# Patient Record
Sex: FEMALE | Race: WHITE | NOT HISPANIC OR LATINO | Employment: OTHER | URBAN - METROPOLITAN AREA
[De-identification: names, ages, dates, MRNs, and addresses within clinical notes are randomized per-mention and may not be internally consistent; named-entity substitution may affect disease eponyms.]

---

## 2024-01-09 DIAGNOSIS — M25.50 ARTHRALGIA, UNSPECIFIED JOINT: Primary | ICD-10-CM

## 2024-01-09 RX ORDER — MELOXICAM 15 MG/1
15 TABLET ORAL DAILY
Qty: 90 TABLET | Refills: 1 | Status: SHIPPED | OUTPATIENT
Start: 2024-01-09 | End: 2024-05-23

## 2024-01-09 RX ORDER — MELOXICAM 15 MG/1
15 TABLET ORAL DAILY
COMMUNITY
Start: 2023-08-28 | End: 2024-01-09 | Stop reason: SDUPTHER

## 2024-01-18 PROBLEM — C54.1 ENDOMETRIAL CANCER (MULTI): Status: ACTIVE | Noted: 2018-10-24

## 2024-01-18 PROBLEM — G47.00 INSOMNIA: Status: ACTIVE | Noted: 2024-01-18

## 2024-01-18 PROBLEM — G47.30 SLEEP APNEA: Status: ACTIVE | Noted: 2024-01-18

## 2024-01-18 PROBLEM — E03.9 HYPOTHYROIDISM: Status: ACTIVE | Noted: 2022-12-07

## 2024-01-18 PROBLEM — D22.9 ATYPICAL MOLE: Status: ACTIVE | Noted: 2024-01-18

## 2024-01-18 PROBLEM — F31.62 BIPOLAR DISORDER, CURRENT EPISODE MIXED, MODERATE (MULTI): Status: ACTIVE | Noted: 2020-10-12

## 2024-01-18 PROBLEM — K52.9 GASTROENTERITIS: Status: ACTIVE | Noted: 2024-01-18

## 2024-01-18 PROBLEM — R15.9 RECTAL INCONTINENCE: Status: ACTIVE | Noted: 2024-01-18

## 2024-01-18 PROBLEM — F41.0 PANIC ATTACKS: Status: ACTIVE | Noted: 2024-01-18

## 2024-01-18 PROBLEM — R10.11 ABDOMINAL PAIN, RUQ (RIGHT UPPER QUADRANT): Status: ACTIVE | Noted: 2024-01-18

## 2024-01-18 PROBLEM — S82.209A TIBIA FRACTURE: Status: ACTIVE | Noted: 2024-01-18

## 2024-01-18 PROBLEM — F43.10 PTSD (POST-TRAUMATIC STRESS DISORDER): Status: ACTIVE | Noted: 2021-05-05

## 2024-01-18 PROBLEM — J45.909 ASTHMA (HHS-HCC): Status: ACTIVE | Noted: 2022-12-07

## 2024-01-18 PROBLEM — T50.902A SUICIDAL OVERDOSE (MULTI): Status: ACTIVE | Noted: 2024-01-18

## 2024-01-18 PROBLEM — J44.9 COPD (CHRONIC OBSTRUCTIVE PULMONARY DISEASE) (MULTI): Status: ACTIVE | Noted: 2019-01-09

## 2024-01-18 PROBLEM — I73.00 RAYNAUD'S DISEASE: Status: ACTIVE | Noted: 2022-12-07

## 2024-01-18 PROBLEM — E55.9 VITAMIN D DEFICIENCY: Status: ACTIVE | Noted: 2024-01-18

## 2024-01-18 PROBLEM — E66.01 CLASS 2 SEVERE OBESITY WITH BODY MASS INDEX (BMI) OF 35 TO 39.9 WITH SERIOUS COMORBIDITY (MULTI): Status: ACTIVE | Noted: 2024-01-18

## 2024-01-18 PROBLEM — R19.7 DIARRHEA: Status: ACTIVE | Noted: 2024-01-18

## 2024-01-18 PROBLEM — G56.03 BILATERAL CARPAL TUNNEL SYNDROME: Status: ACTIVE | Noted: 2024-01-18

## 2024-01-18 PROBLEM — R73.03 PREDIABETES: Status: ACTIVE | Noted: 2024-01-18

## 2024-01-18 PROBLEM — H61.22 HEARING LOSS OF LEFT EAR DUE TO CERUMEN IMPACTION: Status: ACTIVE | Noted: 2024-01-18

## 2024-01-18 PROBLEM — H60.501 ACUTE OTITIS EXTERNA OF RIGHT EAR: Status: ACTIVE | Noted: 2024-01-18

## 2024-01-18 PROBLEM — R63.4 WEIGHT LOSS: Status: ACTIVE | Noted: 2024-01-18

## 2024-01-18 PROBLEM — H66.91 RIGHT ACUTE OTITIS MEDIA: Status: ACTIVE | Noted: 2024-01-18

## 2024-01-18 PROBLEM — E66.812 CLASS 2 SEVERE OBESITY WITH BODY MASS INDEX (BMI) OF 35 TO 39.9 WITH SERIOUS COMORBIDITY: Status: ACTIVE | Noted: 2024-01-18

## 2024-01-18 PROBLEM — K21.9 GASTROESOPHAGEAL REFLUX DISEASE: Status: ACTIVE | Noted: 2022-12-07

## 2024-01-18 PROBLEM — F31.9 BIPOLAR DEPRESSION (MULTI): Status: ACTIVE | Noted: 2024-01-18

## 2024-01-18 PROBLEM — E78.5 HYPERLIPIDEMIA: Status: ACTIVE | Noted: 2024-01-18

## 2024-01-18 PROBLEM — R73.9 HYPERGLYCEMIA: Status: ACTIVE | Noted: 2024-01-18

## 2024-01-21 DIAGNOSIS — E55.9 VITAMIN D DEFICIENCY: Primary | ICD-10-CM

## 2024-01-22 RX ORDER — ERGOCALCIFEROL 1.25 1/1
1 CAPSULE ORAL
Qty: 13 CAPSULE | Refills: 0 | Status: SHIPPED | OUTPATIENT
Start: 2024-01-22 | End: 2024-04-01

## 2024-01-22 RX ORDER — ERGOCALCIFEROL 1.25 MG/1
1 CAPSULE ORAL
COMMUNITY
Start: 2022-09-20

## 2024-01-25 RX ORDER — TIOTROPIUM BROMIDE AND OLODATEROL 3.124; 2.736 UG/1; UG/1
1 SPRAY, METERED RESPIRATORY (INHALATION) 2 TIMES DAILY
COMMUNITY

## 2024-01-31 ENCOUNTER — DOCUMENTATION (OUTPATIENT)
Dept: PRIMARY CARE | Facility: CLINIC | Age: 64
End: 2024-01-31
Payer: COMMERCIAL

## 2024-02-01 ENCOUNTER — TELEPHONE (OUTPATIENT)
Dept: PRIMARY CARE | Facility: CLINIC | Age: 64
End: 2024-02-01
Payer: COMMERCIAL

## 2024-02-01 NOTE — TELEPHONE ENCOUNTER
Pt called and can't remember the name of her pulmonologist, can't find the order for it either, she wants to know if Dr Lira remembers who he recommended or if she can get a new referral

## 2024-02-02 NOTE — PROGRESS NOTES
Patient: Etta Gerard    [id number]  : 1960 -- AGE 63 y.o.    Provider: Sheri Cuevas CNP     Location [facility]   Service Date:   2024            [facility]  New Visit Note      HISTORY OF PRESENT ILLNESS     The patient's referring provider is:Dr Lira, previosly seen by Miko    HISTORY OF PRESENT ILLNESS   Etta Gerard is a 63 y.o. female who presents to a [facility] for an evaluation with concerns of COPD and Asthma (Patient Pulm provider retierd.). I have independently interviewed and examined the patient in the office and reviewed available records.    Current History    On today's visit, the patient reports she wears 3 liters while sleeping. No am headaches. She has restful sleep, no daytime sleepiness. She had CPAP for ABILIO and she lost 100lbs and was able to taper down to nocturnal oxygenation via cannula. Now she has gained 70lbs due to smoking cessation.   At baseline,  has dyspnea on exertion, but  none at rest, but conversational dyspnea. Symptoms started many years ago, but has mostly been stable.  Currently sits for most of the day, works inside the house, but does not carry loads and do strenuous exercise.   Has to stop for breath after walking about 100 meters or a few minutes (mMRC 3).   Relates orthopnea, denies pnd/aidan.  Has gained X 40 pounds in the last X 12 months, since stopped smoking 11 months ago, used patch, nicotine gum.  Improving chronic cough with clear phlegm, C/o  wheezing, and denies, green, blood streaks. No night cough. No hemoptysis. No fever or shivering chills. Has no runny nose, or a tingling sensation in the back of his throat.  Denies chest pain or heartburn.     Previous pulmonary history:  no history of recurrent infections, or lung disease as a child.  No previous lung hx, never on oxygen or inhaler therapy.    previously was told they have asthma.  currently is on  2-3 liters at night supplemental oxygen. No exacerbations this year    Inhalers/nebulized medications: stiolto - been on 5 years and using albuterol 4 times a day consistently    Hospitalization History: Not been hospitalized over the last year for breathing related problem.    Sleep history:  Complains of snoring, has dreams about apnea, feeling tired during the day, and taking naps during the day.   She was tested and used CPAP. She lost 100 lbs then improved.     ALLERGIES AND MEDICATIONS     ALLERGIES  Allergies   Allergen Reactions    Codeine Unknown    Prednisolone Other    Prednisone Unknown       MEDICATIONS  Current Outpatient Medications   Medication Sig Dispense Refill    atorvastatin (Lipitor) 40 mg tablet Take 1 tablet (40 mg) by mouth once daily.      ergocalciferol (Vitamin D-2) 1.25 MG (00884 UT) capsule Take 1 capsule (1,250 mcg) by mouth 1 (one) time per week.      levothyroxine (Synthroid, Levoxyl) 100 mcg tablet TAKE 1 TABLET BY MOUTH DAILY 90 tablet 0    meloxicam (Mobic) 15 mg tablet Take 1 tablet (15 mg) by mouth once daily. 90 tablet 1    Stiolto Respimat 2.5-2.5 mcg/actuation mist inhaler Inhale 1 Inhalation 2 times a day.      atorvastatin (Lipitor) 40 mg tablet TAKE 1 TABLET BY MOUTH DAILY 90 tablet 0    Vitamin D2 1,250 mcg (50,000 unit) capsule TAKE 1 CAPSULE BY MOUTH WEEKLY 13 capsule 0     No current facility-administered medications for this visit.         PAST HISTORY     PAST MEDICAL HISTORY  She  has a past medical history of Personal history of malignant neoplasm, unspecified.hypothyroidism HLD, endometrial cancer    PAST SURGICAL HISTORY  Past Surgical History:   Procedure Laterality Date    OTHER SURGICAL HISTORY  05/20/2019    Colon surgery    OTHER SURGICAL HISTORY  05/20/2019    Hysterectomy    OTHER SURGICAL HISTORY  05/20/2019    Foot surgery    OTHER SURGICAL HISTORY  05/21/2019    Tonsillectomy    OTHER SURGICAL HISTORY  05/21/2019    Cholecystectomy    OTHER SURGICAL  HISTORY  01/16/2022    Tibia fracture repair       IMMUNIZATION HISTORY  Immunization History   Administered Date(s) Administered    Pfizer Purple Cap SARS-CoV-2 07/22/2021, 08/12/2021       SOCIAL HISTORY  She  reports that she quit smoking about 13 months ago. Her smoking use included cigarettes. She has a 92.00 pack-year smoking history. She has never used smokeless tobacco. She reports that she does not currently use alcohol. She reports that she does not use drugs. She Patient     OCCUPATIONAL/ENVIRONMENTAL HISTORY  Previously worked as:    DOES/DOES NOT EC: does not have known exposure to asbestos, silica, beryllium or inhaled metals.  DOES/DOES NOT EC: does have exposure to birds or exotic animals. 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  DOES/DOES NOT EC: does have a family history of cancer.  DOES/DOES NOT EC: does not have a family history of autoimmune disorders.    RESULTS/DATA     Pulmonary Function Test Results     No Testing Done    Chest Radiograph     XR CHEST 1 VIEW 11/22/2019      COMPARISON:  Chest x-ray 11/27/2018, 11/25/2018.      FINDINGS:  The cardiomediastinal silhouette is within normal limits.  There is  no overt vascular congestion.    No focal consolidation, pleural effusion or pneumothorax.    Impression  No acute radiographic finding in the chest.      Chest CT Scan     No Testing Done      Echocardiogram     No testing done         REVIEW OF SYSTEMS     REVIEW OF SYSTEMS  Review of Systems   Respiratory:  Positive for cough and shortness of breath.    Cardiovascular:  Positive for palpitations.  "  Musculoskeletal:  Positive for arthralgias.   All other systems reviewed and are negative.        PHYSICAL EXAM     VITAL SIGNS: /85 (BP Location: Left arm, Patient Position: Sitting, BP Cuff Size: Large adult)   Pulse 89   Temp 36.1 °C (97 °F) (Temporal)   Resp 16   Ht 1.676 m (5' 6\")   Wt 126 kg (277 lb 6.4 oz)   SpO2 (!) 89%   BMI 44.77 kg/m²      CURRENT WEIGHT: [unfilled]  BMI: [unfilled]  PREVIOUS WEIGHTS:  Wt Readings from Last 3 Encounters:   02/05/24 126 kg (277 lb 6.4 oz)   09/12/23 121 kg (266 lb)   07/13/23 118 kg (260 lb)       Physical Exam  Constitutional:       Appearance: Normal appearance.   HENT:      Head: Normocephalic and atraumatic.      Right Ear: External ear normal.      Left Ear: External ear normal.      Nose: Nose normal.      Mouth/Throat:      Mouth: Mucous membranes are moist.      Pharynx: Oropharynx is clear.   Eyes:      Extraocular Movements: Extraocular movements intact.      Conjunctiva/sclera: Conjunctivae normal.      Pupils: Pupils are equal, round, and reactive to light.   Cardiovascular:      Rate and Rhythm: Normal rate and regular rhythm.      Pulses: Normal pulses.      Heart sounds: Normal heart sounds.   Pulmonary:      Effort: Pulmonary effort is normal.      Breath sounds: Normal breath sounds.   Abdominal:      General: Bowel sounds are normal.      Palpations: Abdomen is soft.   Musculoskeletal:         General: Normal range of motion.      Cervical back: Normal range of motion and neck supple.   Skin:     General: Skin is warm and dry.   Neurological:      General: No focal deficit present.      Mental Status: She is alert and oriented to person, place, and time. Mental status is at baseline.   Psychiatric:         Mood and Affect: Mood normal.         Behavior: Behavior normal.         Thought Content: Thought content normal.         Judgment: Judgment normal.         ASSESSMENT/PLAN     Ms. Gerard is a 63 y.o. female and  has a past medical history " of Personal history of malignant neoplasm, unspecified. She was referred to the [facility] for evaluation of asthma/COPD    Problem List and Orders      Assessment and Plan / Recommendations:  Problem List Items Addressed This Visit       COPD (chronic obstructive pulmonary disease) (CMS/HCC)    Sleep apnea - Primary           COPD/asthma  Obtain pulmonary function test and 6 minute walk test  albuterol hfa 2 puffs or albuterol nebs every 4-6 hours as needed  - stop stiolto, start breztri 2 puffs twice a day, rinse mouth afterwards, since needing albuterol  4 times a day  - will check Ig E, RAST panel and CBC with diff - due to asthma (her father had allergic asthma)    Tobacco abuse  Given the duration of their cigarettes smoking they meet the eligibility criteria for lung cancer screening with a low-dose CT scan. We discussed the risks and benefits of screening. We discussed the importance of adhering to annual lung cancer screening with this method, the impact of comorbidities, and their ability or willingness to undergo diagnosis and treatment if abnormalities are discovered.    ABILIO - past history when patient was at this weight, tapered down to nocturnal O2 with weight loss. Now due to weight gain will repeat in lab sleep study    Hypoxia: Presents to clinic today with O2 sat _89__% on room air.  Oxywalk completed. _93% ___% ambulating on room air.        Thank you for visiting the Pulmonary clinic today!       Return to clinic after __4-6_weeks and after PFTs, CT scan and sleep study complete  or sooner if needed   Sheri Cuevas CNP  My office number is [phone] -     Any test results will be discussed at next visit -- please make sure to make a follow up appt after testing.

## 2024-02-05 ENCOUNTER — OFFICE VISIT (OUTPATIENT)
Dept: PULMONOLOGY | Facility: CLINIC | Age: 64
End: 2024-02-05
Payer: MEDICARE

## 2024-02-05 VITALS
TEMPERATURE: 97 F | HEART RATE: 89 BPM | SYSTOLIC BLOOD PRESSURE: 127 MMHG | HEIGHT: 66 IN | DIASTOLIC BLOOD PRESSURE: 85 MMHG | BODY MASS INDEX: 44.58 KG/M2 | OXYGEN SATURATION: 89 % | WEIGHT: 277.4 LBS | RESPIRATION RATE: 16 BRPM

## 2024-02-05 DIAGNOSIS — F17.211 NICOTINE DEPENDENCE, CIGARETTES, IN REMISSION: ICD-10-CM

## 2024-02-05 DIAGNOSIS — J44.9 CHRONIC OBSTRUCTIVE PULMONARY DISEASE, UNSPECIFIED COPD TYPE (MULTI): ICD-10-CM

## 2024-02-05 DIAGNOSIS — G47.30 SLEEP APNEA, UNSPECIFIED TYPE: Primary | ICD-10-CM

## 2024-02-05 DIAGNOSIS — G47.34 NOCTURNAL HYPOXIA: ICD-10-CM

## 2024-02-05 DIAGNOSIS — J45.20 MILD INTERMITTENT ASTHMA, UNSPECIFIED WHETHER COMPLICATED (HHS-HCC): ICD-10-CM

## 2024-02-05 DIAGNOSIS — Z72.0 TOBACCO ABUSE DISORDER: ICD-10-CM

## 2024-02-05 PROCEDURE — 99205 OFFICE O/P NEW HI 60 MIN: CPT | Performed by: NURSE PRACTITIONER

## 2024-02-05 PROCEDURE — 1036F TOBACCO NON-USER: CPT | Performed by: NURSE PRACTITIONER

## 2024-02-05 PROCEDURE — 1159F MED LIST DOCD IN RCRD: CPT | Performed by: NURSE PRACTITIONER

## 2024-02-05 PROCEDURE — 1126F AMNT PAIN NOTED NONE PRSNT: CPT | Performed by: NURSE PRACTITIONER

## 2024-02-05 RX ORDER — BUDESONIDE, GLYCOPYRROLATE, AND FORMOTEROL FUMARATE 160; 9; 4.8 UG/1; UG/1; UG/1
2 AEROSOL, METERED RESPIRATORY (INHALATION)
Qty: 10.7 G | Refills: 3 | Status: SHIPPED | OUTPATIENT
Start: 2024-02-05 | End: 2024-03-19 | Stop reason: SDUPTHER

## 2024-02-05 ASSESSMENT — PAIN SCALES - GENERAL: PAINLEVEL: 0-NO PAIN

## 2024-02-05 ASSESSMENT — ENCOUNTER SYMPTOMS
PALPITATIONS: 1
SHORTNESS OF BREATH: 1
DEPRESSION: 1
LOSS OF SENSATION IN FEET: 0
ARTHRALGIAS: 1
OCCASIONAL FEELINGS OF UNSTEADINESS: 0
COUGH: 1

## 2024-02-05 ASSESSMENT — COLUMBIA-SUICIDE SEVERITY RATING SCALE - C-SSRS
2. HAVE YOU ACTUALLY HAD ANY THOUGHTS OF KILLING YOURSELF?: NO
1. IN THE PAST MONTH, HAVE YOU WISHED YOU WERE DEAD OR WISHED YOU COULD GO TO SLEEP AND NOT WAKE UP?: NO
6. HAVE YOU EVER DONE ANYTHING, STARTED TO DO ANYTHING, OR PREPARED TO DO ANYTHING TO END YOUR LIFE?: NO

## 2024-02-05 ASSESSMENT — PATIENT HEALTH QUESTIONNAIRE - PHQ9
2. FEELING DOWN, DEPRESSED OR HOPELESS: NOT AT ALL
1. LITTLE INTEREST OR PLEASURE IN DOING THINGS: NOT AT ALL
SUM OF ALL RESPONSES TO PHQ9 QUESTIONS 1 AND 2: 0

## 2024-02-05 NOTE — PATIENT INSTRUCTIONS
COPD/asthma  Obtain pulmonary function test and 6 minute walk test  albuterol hfa 2 puffs or albuterol nebs every 4-6 hours as needed  - stop stiolto, start breztri 2 puffs twice a day, rinse mouth afterwards  - will check Ig E, RAST panel and CBC with diff     Tobacco abuse  Given the duration of their cigarettes smoking they meet the eligibility criteria for lung cancer screening with a low-dose CT scan. We discussed the risks and benefits of screening. We discussed the importance of adhering to annual lung cancer screening with this method, the impact of comorbidities, and their ability or willingness to undergo diagnosis and treatment if abnormalities are discovered.    ABILIO - past history when patient was at this weight, tapered down to nocturnal O2 with weight loss. Now due to weight gain will repeat in lab sleep study    Hypoxia: Presents to clinic today with O2 sat _89__% on room air.  Oxywalk completed. _93% ___% ambulating on room air.        Thank you for visiting the Pulmonary clinic today!       Return to clinic after __4-6_weeks and after PFTs, CT scan and sleep study complete  or sooner if needed   Sheri Cuevas CNP  My office number is [phone] -     Any test results will be discussed at next visit -- please make sure to make a follow up appt after testing.

## 2024-02-27 ENCOUNTER — LAB (OUTPATIENT)
Dept: LAB | Facility: LAB | Age: 64
End: 2024-02-27
Payer: MEDICARE

## 2024-02-27 ENCOUNTER — HOSPITAL ENCOUNTER (OUTPATIENT)
Dept: RADIOLOGY | Facility: HOSPITAL | Age: 64
Discharge: HOME | End: 2024-02-27
Payer: MEDICARE

## 2024-02-27 DIAGNOSIS — J44.9 CHRONIC OBSTRUCTIVE PULMONARY DISEASE, UNSPECIFIED (MULTI): ICD-10-CM

## 2024-02-27 DIAGNOSIS — M75.81 OTHER SHOULDER LESIONS, RIGHT SHOULDER: ICD-10-CM

## 2024-02-27 DIAGNOSIS — Z72.0 TOBACCO ABUSE DISORDER: ICD-10-CM

## 2024-02-27 DIAGNOSIS — G47.00 INSOMNIA, UNSPECIFIED: ICD-10-CM

## 2024-02-27 DIAGNOSIS — F31.9 BIPOLAR DISORDER, UNSPECIFIED (MULTI): ICD-10-CM

## 2024-02-27 DIAGNOSIS — F17.211 NICOTINE DEPENDENCE, CIGARETTES, IN REMISSION: ICD-10-CM

## 2024-02-27 DIAGNOSIS — K52.9 NONINFECTIVE GASTROENTERITIS AND COLITIS, UNSPECIFIED: ICD-10-CM

## 2024-02-27 DIAGNOSIS — R73.03 PREDIABETES: ICD-10-CM

## 2024-02-27 DIAGNOSIS — K21.9 GASTRO-ESOPHAGEAL REFLUX DISEASE WITHOUT ESOPHAGITIS: ICD-10-CM

## 2024-02-27 DIAGNOSIS — G47.30 SLEEP APNEA, UNSPECIFIED: ICD-10-CM

## 2024-02-27 DIAGNOSIS — E55.9 VITAMIN D DEFICIENCY, UNSPECIFIED: ICD-10-CM

## 2024-02-27 DIAGNOSIS — R73.9 HYPERGLYCEMIA, UNSPECIFIED: ICD-10-CM

## 2024-02-27 DIAGNOSIS — E03.9 HYPOTHYROIDISM, UNSPECIFIED: ICD-10-CM

## 2024-02-27 DIAGNOSIS — E78.5 HYPERLIPIDEMIA, UNSPECIFIED: ICD-10-CM

## 2024-02-27 DIAGNOSIS — Z00.00 ENCOUNTER FOR GENERAL ADULT MEDICAL EXAMINATION WITHOUT ABNORMAL FINDINGS: Primary | ICD-10-CM

## 2024-02-27 LAB
25(OH)D3 SERPL-MCNC: 67 NG/ML (ref 30–100)
ALBUMIN SERPL BCP-MCNC: 4.1 G/DL (ref 3.4–5)
ALP SERPL-CCNC: 166 U/L (ref 33–136)
ALT SERPL W P-5'-P-CCNC: 22 U/L (ref 7–45)
ANION GAP SERPL CALC-SCNC: 14 MMOL/L (ref 10–20)
AST SERPL W P-5'-P-CCNC: 19 U/L (ref 9–39)
BILIRUB SERPL-MCNC: 0.7 MG/DL (ref 0–1.2)
BUN SERPL-MCNC: 18 MG/DL (ref 6–23)
CALCIUM SERPL-MCNC: 9.8 MG/DL (ref 8.6–10.3)
CHLORIDE SERPL-SCNC: 102 MMOL/L (ref 98–107)
CHOLEST SERPL-MCNC: 170 MG/DL (ref 0–199)
CHOLESTEROL/HDL RATIO: 2.9
CO2 SERPL-SCNC: 28 MMOL/L (ref 21–32)
CREAT SERPL-MCNC: 0.78 MG/DL (ref 0.5–1.05)
EGFRCR SERPLBLD CKD-EPI 2021: 84 ML/MIN/1.73M*2
ERYTHROCYTE [DISTWIDTH] IN BLOOD BY AUTOMATED COUNT: 12 % (ref 11.5–14.5)
EST. AVERAGE GLUCOSE BLD GHB EST-MCNC: 128 MG/DL
GLUCOSE SERPL-MCNC: 145 MG/DL (ref 74–99)
HBA1C MFR BLD: 6.1 %
HCT VFR BLD AUTO: 46.5 % (ref 36–46)
HDLC SERPL-MCNC: 59.4 MG/DL
HGB BLD-MCNC: 15.5 G/DL (ref 12–16)
LDLC SERPL CALC-MCNC: 88 MG/DL
MCH RBC QN AUTO: 29.1 PG (ref 26–34)
MCHC RBC AUTO-ENTMCNC: 33.3 G/DL (ref 32–36)
MCV RBC AUTO: 87 FL (ref 80–100)
NON HDL CHOLESTEROL: 111 MG/DL (ref 0–149)
NRBC BLD-RTO: 0 /100 WBCS (ref 0–0)
PLATELET # BLD AUTO: 303 X10*3/UL (ref 150–450)
POTASSIUM SERPL-SCNC: 4.3 MMOL/L (ref 3.5–5.3)
PROT SERPL-MCNC: 7 G/DL (ref 6.4–8.2)
RBC # BLD AUTO: 5.32 X10*6/UL (ref 4–5.2)
SODIUM SERPL-SCNC: 140 MMOL/L (ref 136–145)
TRIGL SERPL-MCNC: 111 MG/DL (ref 0–149)
TSH SERPL-ACNC: 0.93 MIU/L (ref 0.44–3.98)
VLDL: 22 MG/DL (ref 0–40)
WBC # BLD AUTO: 7.5 X10*3/UL (ref 4.4–11.3)

## 2024-02-27 PROCEDURE — 84443 ASSAY THYROID STIM HORMONE: CPT

## 2024-02-27 PROCEDURE — 71271 CT THORAX LUNG CANCER SCR C-: CPT

## 2024-02-27 PROCEDURE — 85027 COMPLETE CBC AUTOMATED: CPT

## 2024-02-27 PROCEDURE — 83036 HEMOGLOBIN GLYCOSYLATED A1C: CPT

## 2024-02-27 PROCEDURE — 82306 VITAMIN D 25 HYDROXY: CPT

## 2024-02-27 PROCEDURE — 80053 COMPREHEN METABOLIC PANEL: CPT

## 2024-02-27 PROCEDURE — 80061 LIPID PANEL: CPT

## 2024-02-27 PROCEDURE — 36415 COLL VENOUS BLD VENIPUNCTURE: CPT

## 2024-03-05 ASSESSMENT — ENCOUNTER SYMPTOMS
SHORTNESS OF BREATH: 1
PALPITATIONS: 1
ARTHRALGIAS: 1
COUGH: 1

## 2024-03-13 DIAGNOSIS — E03.9 ACQUIRED HYPOTHYROIDISM: ICD-10-CM

## 2024-03-14 RX ORDER — LEVOTHYROXINE SODIUM 100 UG/1
100 TABLET ORAL DAILY
Qty: 90 TABLET | Refills: 0 | Status: SHIPPED | OUTPATIENT
Start: 2024-03-14 | End: 2024-05-09 | Stop reason: SDUPTHER

## 2024-03-15 ENCOUNTER — HOSPITAL ENCOUNTER (OUTPATIENT)
Dept: RESPIRATORY THERAPY | Facility: HOSPITAL | Age: 64
Discharge: HOME | End: 2024-03-15
Payer: MEDICARE

## 2024-03-15 DIAGNOSIS — J44.9 CHRONIC OBSTRUCTIVE PULMONARY DISEASE, UNSPECIFIED COPD TYPE (MULTI): ICD-10-CM

## 2024-03-15 PROCEDURE — 94726 PLETHYSMOGRAPHY LUNG VOLUMES: CPT

## 2024-03-15 PROCEDURE — 94060 EVALUATION OF WHEEZING: CPT | Performed by: INTERNAL MEDICINE

## 2024-03-15 PROCEDURE — 94726 PLETHYSMOGRAPHY LUNG VOLUMES: CPT | Performed by: INTERNAL MEDICINE

## 2024-03-15 PROCEDURE — 94618 PULMONARY STRESS TESTING: CPT | Performed by: INTERNAL MEDICINE

## 2024-03-15 PROCEDURE — 94729 DIFFUSING CAPACITY: CPT | Performed by: INTERNAL MEDICINE

## 2024-03-19 ENCOUNTER — OFFICE VISIT (OUTPATIENT)
Dept: PULMONOLOGY | Facility: CLINIC | Age: 64
End: 2024-03-19
Payer: MEDICARE

## 2024-03-19 VITALS
WEIGHT: 272.4 LBS | BODY MASS INDEX: 43.78 KG/M2 | HEART RATE: 103 BPM | RESPIRATION RATE: 20 BRPM | SYSTOLIC BLOOD PRESSURE: 103 MMHG | HEIGHT: 66 IN | TEMPERATURE: 97.1 F | DIASTOLIC BLOOD PRESSURE: 79 MMHG

## 2024-03-19 DIAGNOSIS — J44.9 CHRONIC OBSTRUCTIVE PULMONARY DISEASE, UNSPECIFIED COPD TYPE (MULTI): ICD-10-CM

## 2024-03-19 DIAGNOSIS — G47.30 SLEEP APNEA, UNSPECIFIED TYPE: Primary | ICD-10-CM

## 2024-03-19 DIAGNOSIS — R91.1 PULMONARY NODULE: ICD-10-CM

## 2024-03-19 DIAGNOSIS — Z72.0 TOBACCO ABUSE DISORDER: ICD-10-CM

## 2024-03-19 DIAGNOSIS — G47.34 NOCTURNAL HYPOXIA: ICD-10-CM

## 2024-03-19 DIAGNOSIS — J45.20 MILD INTERMITTENT ASTHMA, UNSPECIFIED WHETHER COMPLICATED (HHS-HCC): ICD-10-CM

## 2024-03-19 LAB
MGC ASCENT PFT - FEV1 - POST: 2.02
MGC ASCENT PFT - FEV1 - PRE: 1.93
MGC ASCENT PFT - FEV1 - PREDICTED: 2.42
MGC ASCENT PFT - FVC - POST: 2.93
MGC ASCENT PFT - FVC - PRE: 2.88
MGC ASCENT PFT - FVC - PREDICTED: 3.09

## 2024-03-19 PROCEDURE — 1159F MED LIST DOCD IN RCRD: CPT | Performed by: NURSE PRACTITIONER

## 2024-03-19 PROCEDURE — 99214 OFFICE O/P EST MOD 30 MIN: CPT | Performed by: NURSE PRACTITIONER

## 2024-03-19 PROCEDURE — 1036F TOBACCO NON-USER: CPT | Performed by: NURSE PRACTITIONER

## 2024-03-19 RX ORDER — BUDESONIDE, GLYCOPYRROLATE, AND FORMOTEROL FUMARATE 160; 9; 4.8 UG/1; UG/1; UG/1
2 AEROSOL, METERED RESPIRATORY (INHALATION)
Qty: 10.7 G | Refills: 3 | Status: SHIPPED | OUTPATIENT
Start: 2024-03-19 | End: 2024-06-05

## 2024-03-19 ASSESSMENT — PATIENT HEALTH QUESTIONNAIRE - PHQ9
SUM OF ALL RESPONSES TO PHQ9 QUESTIONS 1 AND 2: 0
1. LITTLE INTEREST OR PLEASURE IN DOING THINGS: NOT AT ALL
2. FEELING DOWN, DEPRESSED OR HOPELESS: NOT AT ALL

## 2024-03-19 ASSESSMENT — COLUMBIA-SUICIDE SEVERITY RATING SCALE - C-SSRS
1. IN THE PAST MONTH, HAVE YOU WISHED YOU WERE DEAD OR WISHED YOU COULD GO TO SLEEP AND NOT WAKE UP?: NO
6. HAVE YOU EVER DONE ANYTHING, STARTED TO DO ANYTHING, OR PREPARED TO DO ANYTHING TO END YOUR LIFE?: NO
2. HAVE YOU ACTUALLY HAD ANY THOUGHTS OF KILLING YOURSELF?: NO

## 2024-03-19 ASSESSMENT — ENCOUNTER SYMPTOMS
DEPRESSION: 0
LOSS OF SENSATION IN FEET: 0
OCCASIONAL FEELINGS OF UNSTEADINESS: 0

## 2024-03-19 NOTE — PATIENT INSTRUCTIONS
COPD/asthma  3/15/2024 pulmonary function test  with Normal spirometry. There is no significant bronchodilator response. Lung volumes are normal. Air trapping is present.The DLCO corrected for hemoglobin is normal.6 minute walk test: patient walked 305 meters, no significant desaturation on room   6 minute walk test no desaturation   FENO 6  albuterol hfa 2 puffs or albuterol nebs every 4-6 hours as needed  - symptoms improving with breztri, cont breztri 2 puffs twice a day, rinse mouth afterwards, since needing albuterol  4 times a day, refilled breztri  - will check Ig E, RAST panel and CBC with diff - due to asthma (her father had allergic asthma) - did not obtain     Tobacco abuse - stopped smoking 1 year ago   Given the duration of their cigarettes smoking they meet the eligibility criteria for lung cancer screening with a low-dose CT scan. We discussed the risks and benefits of screening. We discussed the importance of adhering to annual lung cancer screening with this method, the impact of comorbidities, and their ability or willingness to undergo diagnosis and treatment if abnormalities are discovered.    2/27 CT chest revealed  10 mm nodular density in the right lung base, located along a bandlike opacity and likely a more focal area of atelectasis or scarring. Recommend however short-term follow-up chest CT in 3 months to confirm stability or improvement. 2. Additional scattered pulmonary nodules measuring up to 5 mm predominantly in the right lung as described above. 3. Mild upper lung predominant emphysema.  4. Small hiatal hernia.- repeat CT chest in 3 months if growing will proceed with bronchoscopy    ABILIO - past history when patient was at this weight, tapered down to nocturnal O2 with weight loss. Now due to weight gain will repeat in lab sleep study- sleep schedule not completed     Hypoxia: Presents to clinic today with O2 sat _90__% on room air. Oxywalk completed. _90-93% ___% ambulating on room  air.

## 2024-04-18 PROBLEM — F31.30 BIPOLAR DISORDER, MOST RECENT EPISODE DEPRESSED (MULTI): Status: ACTIVE | Noted: 2019-01-09

## 2024-04-18 PROBLEM — R09.02 HYPOXIA: Status: ACTIVE | Noted: 2024-04-18

## 2024-04-18 PROBLEM — H60.509 ACUTE OTITIS EXTERNA: Status: ACTIVE | Noted: 2024-01-18

## 2024-04-18 PROBLEM — R91.1 LUNG NODULE: Status: ACTIVE | Noted: 2024-04-18

## 2024-04-18 PROBLEM — E66.9 OBESITY WITH BODY MASS INDEX 30 OR GREATER: Status: ACTIVE | Noted: 2024-04-18

## 2024-04-18 PROBLEM — M75.81 TENDINITIS OF RIGHT ROTATOR CUFF: Status: ACTIVE | Noted: 2024-04-18

## 2024-04-18 PROBLEM — H91.92 HEARING LOSS OF LEFT EAR: Status: ACTIVE | Noted: 2024-01-18

## 2024-04-18 PROBLEM — M25.512 PAIN OF LEFT SHOULDER REGION: Status: ACTIVE | Noted: 2024-04-18

## 2024-04-18 PROBLEM — G56.00 CARPAL TUNNEL SYNDROME: Status: ACTIVE | Noted: 2024-04-18

## 2024-04-18 PROBLEM — T14.91XA ATTEMPTED SUICIDE (MULTI): Status: ACTIVE | Noted: 2024-01-18

## 2024-04-18 PROBLEM — D22.9 MELANOCYTIC NEVUS: Status: ACTIVE | Noted: 2024-01-18

## 2024-04-18 PROBLEM — Z85.9 HISTORY OF MALIGNANT NEOPLASM: Status: ACTIVE | Noted: 2024-04-18

## 2024-04-18 PROBLEM — U07.1 DISEASE DUE TO SEVERE ACUTE RESPIRATORY SYNDROME CORONAVIRUS 2 (SARS-COV-2): Status: ACTIVE | Noted: 2024-04-18

## 2024-04-18 PROBLEM — Z85.42 HISTORY OF ENDOMETRIAL CANCER: Status: ACTIVE | Noted: 2022-08-04

## 2024-04-18 PROBLEM — F31.10 BIPOLAR AFFECTIVE DISORDER, CURRENT EPISODE MANIC (MULTI): Status: ACTIVE | Noted: 2024-04-18

## 2024-04-18 PROBLEM — F41.0 PANIC ATTACK: Status: ACTIVE | Noted: 2021-05-05

## 2024-04-18 PROBLEM — F17.200 CURRENT SMOKER: Status: ACTIVE | Noted: 2024-04-18

## 2024-04-22 ENCOUNTER — OFFICE VISIT (OUTPATIENT)
Dept: PRIMARY CARE | Facility: CLINIC | Age: 64
End: 2024-04-22
Payer: MEDICARE

## 2024-04-22 VITALS
WEIGHT: 270 LBS | DIASTOLIC BLOOD PRESSURE: 70 MMHG | OXYGEN SATURATION: 93 % | HEART RATE: 75 BPM | HEIGHT: 65 IN | TEMPERATURE: 97 F | BODY MASS INDEX: 44.98 KG/M2 | SYSTOLIC BLOOD PRESSURE: 108 MMHG

## 2024-04-22 DIAGNOSIS — F41.0 PANIC ATTACK: ICD-10-CM

## 2024-04-22 DIAGNOSIS — F31.11 BIPOLAR AFFECTIVE DISORDER, CURRENTLY MANIC, MILD (MULTI): ICD-10-CM

## 2024-04-22 DIAGNOSIS — F51.01 PRIMARY INSOMNIA: ICD-10-CM

## 2024-04-22 DIAGNOSIS — E55.9 VITAMIN D DEFICIENCY: ICD-10-CM

## 2024-04-22 DIAGNOSIS — Z71.3 WEIGHT LOSS COUNSELING, ENCOUNTER FOR: ICD-10-CM

## 2024-04-22 DIAGNOSIS — E03.9 ACQUIRED HYPOTHYROIDISM: ICD-10-CM

## 2024-04-22 DIAGNOSIS — C54.1 MALIGNANT NEOPLASM OF ENDOMETRIUM (MULTI): ICD-10-CM

## 2024-04-22 DIAGNOSIS — R73.03 PREDIABETES: ICD-10-CM

## 2024-04-22 DIAGNOSIS — Z00.00 MEDICARE ANNUAL WELLNESS VISIT, SUBSEQUENT: Primary | ICD-10-CM

## 2024-04-22 DIAGNOSIS — J45.20 MILD INTERMITTENT ASTHMA WITHOUT COMPLICATION (HHS-HCC): ICD-10-CM

## 2024-04-22 DIAGNOSIS — Z12.11 ENCOUNTER FOR SCREENING FOR MALIGNANT NEOPLASM OF COLON: ICD-10-CM

## 2024-04-22 PROCEDURE — 99214 OFFICE O/P EST MOD 30 MIN: CPT | Performed by: FAMILY MEDICINE

## 2024-04-22 PROCEDURE — G0402 INITIAL PREVENTIVE EXAM: HCPCS | Performed by: FAMILY MEDICINE

## 2024-04-22 PROCEDURE — 99497 ADVNCD CARE PLAN 30 MIN: CPT | Performed by: FAMILY MEDICINE

## 2024-04-22 PROCEDURE — 1124F ACP DISCUSS-NO DSCNMKR DOCD: CPT | Performed by: FAMILY MEDICINE

## 2024-04-22 PROCEDURE — 1036F TOBACCO NON-USER: CPT | Performed by: FAMILY MEDICINE

## 2024-04-22 PROCEDURE — 1159F MED LIST DOCD IN RCRD: CPT | Performed by: FAMILY MEDICINE

## 2024-04-22 RX ORDER — ARIPIPRAZOLE 15 MG/1
1 TABLET ORAL DAILY
COMMUNITY
Start: 2022-09-12

## 2024-04-22 RX ORDER — ALBUTEROL SULFATE 90 UG/1
AEROSOL, METERED RESPIRATORY (INHALATION)
COMMUNITY
Start: 2023-02-13 | End: 2024-06-05 | Stop reason: SDUPTHER

## 2024-04-22 RX ORDER — PHENTERMINE HYDROCHLORIDE 37.5 MG/1
37.5 TABLET ORAL
Qty: 14 TABLET | Refills: 0 | Status: SHIPPED | OUTPATIENT
Start: 2024-04-22 | End: 2024-05-10 | Stop reason: SDUPTHER

## 2024-04-22 RX ORDER — ESCITALOPRAM OXALATE 10 MG/1
TABLET ORAL DAILY
COMMUNITY
Start: 2023-04-24

## 2024-04-22 RX ORDER — ZOLPIDEM TARTRATE 10 MG/1
10 TABLET ORAL DAILY PRN
COMMUNITY
Start: 2024-02-19 | End: 2024-05-19

## 2024-04-22 RX ORDER — IPRATROPIUM BROMIDE AND ALBUTEROL SULFATE 2.5; .5 MG/3ML; MG/3ML
SOLUTION RESPIRATORY (INHALATION)
COMMUNITY

## 2024-04-22 RX ORDER — DOXEPIN HYDROCHLORIDE 25 MG/1
25 CAPSULE ORAL
COMMUNITY
Start: 2023-07-12 | End: 2025-01-09

## 2024-04-22 ASSESSMENT — PATIENT HEALTH QUESTIONNAIRE - PHQ9
SUM OF ALL RESPONSES TO PHQ9 QUESTIONS 1 AND 2: 0
2. FEELING DOWN, DEPRESSED OR HOPELESS: NOT AT ALL
1. LITTLE INTEREST OR PLEASURE IN DOING THINGS: NOT AT ALL

## 2024-04-22 NOTE — PROGRESS NOTES
Advance Care Planning Note     Discussion Date: 04/22/24   Discussion Participants: patient    The patient wishes to discuss Advance Care Planning today and the following is a brief summary of our discussion.     Patient has capacity to make their own medical decisions: Yes  Health Care Agent/Surrogate Decision Maker documented in chart: No    Documents on file and valid:  Advance Directive/Living Will: No   Health Care Power of : No  Other: to be done     Communication of Medical Status/Prognosis:   na     Communication of Treatment Goals/Options:   na     Treatment Decisions  Goals of Care: survival is paramount regardless of prognosis, treatment outcome, or burden   na  Follow Up Plan  na  Team Members  na  Time Statement: Total face to face time spent on advance care planning was >15 minutes with 16 minutes spent in counseling, including the explanation.    Nik Lira, DO  4/22/2024 11:56 AM  Patient is here for annual wellness.  She is never had a colonoscopy does have family first-degree relatives with colon cancer would like to be screened.    Patient also would like to talk about medical weight loss she does not have any calories and 1 pound of fat and with the other medications as needed weight over the years.    REVIEW OF SYSTEMS: 12 systems negative except for those mentioned in the HPI. Reviewed home risks with patient. Patient feels safe at home.    PHYSICAL EXAMINATION:   Constitutional: The patient is alert and oriented x3, in no acute  distress.  Eyes: Extraocular movements are intact. Pupils are equal and reactive to light  ENT: Bilateral TMs within normal limits. Nasal turbinates are within normal limits. Throat within normal limits.  Neck: Supple without lymphadenopathy or JVD.  Heart: Regular rate and rhythm without murmur, click, gallop, or rub.  Lungs: Clear to auscultation bilaterally. No rales, rhonchi, or  wheezing.  Abdomen: Soft, nontender, nondistended. Normoactive bowel  sounds.   No palpable masses. Normal percussion  Musculoskeletal: 5/5 motor, upper and lower extremities.  Neurologic: Cranial nerves II through XII fully intact. +2/4 DTRs  in ankle and knee.  Psychiatric: Good judgment and insight. Normal affect and mood. No cognitive defects noted.  Lymphatic: Negative as noted above.  Skin: no rashes or lesions    Assessment:  Per EMR    Plan:  PHQ-9 is reviewed as well as also obesity.  I discussed and calculated out the patient at the maximum of 1400 leanne patient was 7 pounds per calendar month and would potentially do better if she can get up and exercise.    I discussed the patient medical weight loss and OARRS reviewed appropriate go on Adipex.  Also reviewed her blood work she is prediabetic as well this will also help treat this.  Discussed risk benefits will follow-up in 2 weeks and calorie counting.  Blood pressure is also well-maintained.  I will be working on the sugar.  Mood is also well-maintained and sleep  Discussed with the patient and reviewed annual wellness questionnaire form as well as cognitive deficits. Also discussed with the patient immunizations and risks for colonoscopy and other screening procedures    This dictation was created using Dragon dictation and may contain errors

## 2024-05-09 DIAGNOSIS — E03.9 ACQUIRED HYPOTHYROIDISM: ICD-10-CM

## 2024-05-09 RX ORDER — LEVOTHYROXINE SODIUM 100 UG/1
100 TABLET ORAL DAILY
Qty: 90 TABLET | Refills: 0 | Status: SHIPPED | OUTPATIENT
Start: 2024-05-09

## 2024-05-10 ENCOUNTER — OFFICE VISIT (OUTPATIENT)
Dept: PRIMARY CARE | Facility: CLINIC | Age: 64
End: 2024-05-10
Payer: MEDICARE

## 2024-05-10 VITALS
SYSTOLIC BLOOD PRESSURE: 112 MMHG | DIASTOLIC BLOOD PRESSURE: 78 MMHG | WEIGHT: 268 LBS | HEART RATE: 85 BPM | BODY MASS INDEX: 44.65 KG/M2 | HEIGHT: 65 IN | TEMPERATURE: 96 F

## 2024-05-10 DIAGNOSIS — R73.03 PREDIABETES: ICD-10-CM

## 2024-05-10 DIAGNOSIS — Z71.3 WEIGHT LOSS COUNSELING, ENCOUNTER FOR: Primary | ICD-10-CM

## 2024-05-10 DIAGNOSIS — F17.200 CURRENT SMOKER: ICD-10-CM

## 2024-05-10 DIAGNOSIS — F31.9 BIPOLAR DEPRESSION (MULTI): ICD-10-CM

## 2024-05-10 DIAGNOSIS — E66.01 OBESITY, CLASS III, BMI 40-49.9 (MORBID OBESITY) (MULTI): ICD-10-CM

## 2024-05-10 PROBLEM — E66.813 OBESITY, CLASS III, BMI 40-49.9 (MORBID OBESITY): Status: ACTIVE | Noted: 2024-05-10

## 2024-05-10 PROCEDURE — 1036F TOBACCO NON-USER: CPT | Performed by: FAMILY MEDICINE

## 2024-05-10 PROCEDURE — 1159F MED LIST DOCD IN RCRD: CPT | Performed by: FAMILY MEDICINE

## 2024-05-10 PROCEDURE — 99213 OFFICE O/P EST LOW 20 MIN: CPT | Performed by: FAMILY MEDICINE

## 2024-05-10 RX ORDER — PHENTERMINE HYDROCHLORIDE 37.5 MG/1
37.5 TABLET ORAL
Qty: 30 TABLET | Refills: 0 | Status: SHIPPED | OUTPATIENT
Start: 2024-05-10 | End: 2024-06-10 | Stop reason: SDUPTHER

## 2024-05-10 NOTE — PROGRESS NOTES
Patient is here 2-week follow-up of weight loss.  She has not been having a lot of exercise has been tracking her food and trying not to eat after 3:00.    REVIEW OF SYSTEMS: 12 systems negative except for those mentioned in the HPI.    PHYSICAL EXAMINATION:   Constitutional: The patient is alert, in no acute  distress.  Eyes: Extraocular movements are intact.   ENT: external ear canals patent  Neck: no  JVD.  Heart: no JVD  Lungs: Normal respiration without stridor or nasal flaring   Psychiatric: Good judgment and insight. Normal affect and mood.  Skin: no rashes or lesions    Assessment:  per EMR    Plan:  Reevaluation calories as well as also liquid calories discussed with the patient.  She will continue Adipex also try and have a little bit more activity level to help.  I went through some of her common foods and discussed caloric intake.  Will follow-up in 1 month    This dictation was created using Dragon dictation and may contain errors

## 2024-05-14 ENCOUNTER — APPOINTMENT (OUTPATIENT)
Dept: PRIMARY CARE | Facility: CLINIC | Age: 64
End: 2024-05-14
Payer: MEDICARE

## 2024-05-28 ENCOUNTER — HOSPITAL ENCOUNTER (OUTPATIENT)
Dept: RADIOLOGY | Facility: HOSPITAL | Age: 64
Discharge: HOME | End: 2024-05-28
Payer: MEDICARE

## 2024-05-28 DIAGNOSIS — R91.1 PULMONARY NODULE: ICD-10-CM

## 2024-05-28 DIAGNOSIS — Z72.0 TOBACCO ABUSE DISORDER: ICD-10-CM

## 2024-05-28 PROCEDURE — 71250 CT THORAX DX C-: CPT

## 2024-05-28 PROCEDURE — 71250 CT THORAX DX C-: CPT | Performed by: RADIOLOGY

## 2024-05-30 ASSESSMENT — ENCOUNTER SYMPTOMS
SHORTNESS OF BREATH: 1
ARTHRALGIAS: 1
COUGH: 1
PALPITATIONS: 1

## 2024-05-30 NOTE — PROGRESS NOTES
Patient: Etta Gerard    [id number]  : 1960 -- AGE 63 y.o.    Provider: Sheri Cuevas CNP     Location [facility]   Service Date:   2024            [facility]  New Visit Note      HISTORY OF PRESENT ILLNESS     The patient's referring provider is:Dr Lira, previosly seen by Miko    HISTORY OF PRESENT ILLNESS   Etta Gerard is a 63 y.o. female who presents to a [facility] for an evaluation with concerns of No chief complaint on file.. I have independently interviewed and examined the patient in the office and reviewed available records.    Current History    On today's visit, the patient reports no ER visits. No cough,       Her breathing is getting worse, she is feeling more dyspnenic after a couple minutes. Able to do chores at home.  She becomes very SOB. She is using albuterol a couple times a day.   Denies cough, wheeze, Fevers/chills, BUSTILLO, SOB at rest, chest pain  Allergies - c/o runny nose but denies  post nasal drip   GERD denies   Night time - denies   She wears 3 liters at bedtime not when walking does not have portable unit.         Current History  3/19/2024    On today's visit, the patient reports she started breztri and she could breathe a lot better. She could breathe in better, She clears her throat a lot but does not cough. Her shortness of breath is better, she is not gasping for air. A little wheezing. Denies Fevers/chills chest pain or GERD,    She wears oxygen at night.  She has brought in her oxygen mask to show which is better. She has a dog who bites everyone and she does not have anyone to watch her dog  Allergies denies runny nose or post nasal drip   GERD denies   ED visits denies   Up to date on vaccines - did not get Flu vaccine or COVID booster  Night time - has cough at night  C/o unrested sleep.   She did not use Breztri today        Current History 2024     On today's visit,  the patient reports she wears 3 liters while sleeping. No am headaches. She has restful sleep, no daytime sleepiness. She had CPAP for ABILIO and she lost 100lbs and was able to taper down to nocturnal oxygenation via cannula. Now she has gained 70lbs due to smoking cessation.   At baseline,  has dyspnea on exertion, but  none at rest, but conversational dyspnea. Symptoms started many years ago, but has mostly been stable.  Currently sits for most of the day, works inside the house, but does not carry loads and do strenuous exercise.   Has to stop for breath after walking about 100 meters or a few minutes (mMRC 3).   Relates orthopnea, denies pnd/aidan.  Has gained X 40 pounds in the last X 12 months, since stopped smoking 11 months ago, used patch, nicotine gum.  Improving chronic cough with clear phlegm, C/o  wheezing, and denies, green, blood streaks. No night cough. No hemoptysis. No fever or shivering chills. Has no runny nose, or a tingling sensation in the back of his throat.  Denies chest pain or heartburn.     Previous pulmonary history:  no history of recurrent infections, or lung disease as a child.  No previous lung hx, never on oxygen or inhaler therapy.    previously was told they have asthma.  currently is on 2-3 liters at night supplemental oxygen. No exacerbations this year    Inhalers/nebulized medications: stiolto - been on 5 years and using albuterol 4 times a day consistently    Hospitalization History: Not been hospitalized over the last year for breathing related problem.    Sleep history:  Complains of snoring, has dreams about apnea, feeling tired during the day, and taking naps during the day.   She was tested and used CPAP. She lost 100 lbs then improved.     ALLERGIES AND MEDICATIONS     ALLERGIES  Allergies   Allergen Reactions    Codeine Unknown    Prednisolone Other    Prednisone Unknown       MEDICATIONS  Current Outpatient Medications   Medication Sig Dispense Refill    albuterol 90  mcg/actuation inhaler INHALE 1 PUFF BY MOUTH EVERY 4 HOURS AS NEEDED FOR COUGH OR WHEEZING      ARIPiprazole (Abilify) 15 mg tablet Take 1 tablet (15 mg) by mouth once daily.      atorvastatin (Lipitor) 40 mg tablet TAKE 1 TABLET BY MOUTH DAILY 90 tablet 0    budesonide-glycopyr-formoterol (Breztri Aerosphere) 160-9-4.8 mcg/actuation HFA aerosol inhaler Inhale 2 puffs 2 times a day. Rinse mouth with water after use to reduce aftertaste and incidence of candidiasis. Do not swallow. 10.7 g 3    doxepin (SINEquan) 25 mg capsule Take 1 capsule (25 mg) by mouth.      ergocalciferol (Vitamin D-2) 1.25 MG (41588 UT) capsule Take 1 capsule (1,250 mcg) by mouth 1 (one) time per week.      escitalopram (Lexapro) 10 mg tablet Take by mouth once daily.      ipratropium-albuteroL (Duo-Neb) 0.5-2.5 mg/3 mL nebulizer solution Inhale once daily.      levothyroxine (Synthroid, Levoxyl) 100 mcg tablet Take 1 tablet (100 mcg) by mouth once daily. 90 tablet 0    meloxicam (Mobic) 15 mg tablet TAKE 1 TABLET BY MOUTH ONCE  DAILY 90 tablet 0    phentermine (Adipex-P) 37.5 mg tablet Take 1 tablet (37.5 mg) by mouth once daily in the morning. Take before meals. 30 tablet 0    Stiolto Respimat 2.5-2.5 mcg/actuation mist inhaler Inhale 1 Inhalation 2 times a day.      Vitamin D2 1,250 mcg (50,000 unit) capsule TAKE 1 CAPSULE BY MOUTH WEEKLY 13 capsule 0    zolpidem (Ambien) 10 mg tablet Take 1 tablet (10 mg) by mouth once daily as needed.       No current facility-administered medications for this visit.         PAST HISTORY     PAST MEDICAL HISTORY  She  has a past medical history of Personal history of malignant neoplasm, unspecified.hypothyroidism HLD, endometrial cancer    PAST SURGICAL HISTORY  Past Surgical History:   Procedure Laterality Date    OTHER SURGICAL HISTORY  05/20/2019    Colon surgery    OTHER SURGICAL HISTORY  05/20/2019    Hysterectomy    OTHER SURGICAL HISTORY  05/20/2019    Foot surgery    OTHER SURGICAL HISTORY   05/21/2019    Tonsillectomy    OTHER SURGICAL HISTORY  05/21/2019    Cholecystectomy    OTHER SURGICAL HISTORY  01/16/2022    Tibia fracture repair       IMMUNIZATION HISTORY  Immunization History   Administered Date(s) Administered    Flu vaccine (IIV4), preservative free *Check age/dose* 09/18/2017, 10/22/2018, 10/08/2019, 11/06/2020, 11/02/2021, 09/20/2022    Influenza, Unspecified 09/20/2022    Influenza, seasonal, injectable 10/10/2018    Pfizer Purple Cap SARS-CoV-2 07/22/2021, 08/12/2021    Pneumococcal polysaccharide vaccine, 23-valent, age 2 years and older (PNEUMOVAX 23) 10/22/2018    Tdap vaccine, age 7 year and older (BOOSTRIX, ADACEL) 09/18/2017    Zoster vaccine, recombinant, adult (SHINGRIX) 02/22/2022, 08/09/2022       SOCIAL HISTORY  She  reports that she quit smoking about 16 months ago. Her smoking use included cigarettes. She started smoking about 47 years ago. She has a 92 pack-year smoking history. She has never used smokeless tobacco. She reports that she does not currently use alcohol. She reports that she does not use drugs. She Patient     OCCUPATIONAL/ENVIRONMENTAL HISTORY  Previously worked as:    DOES/DOES NOT EC: does not have known exposure to asbestos, silica, beryllium or inhaled metals.  DOES/DOES NOT EC: does have exposure to birds or exotic animals. 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  DOES/DOES NOT EC: does have a family history of cancer.  DOES/DOES NOT EC: does not have a family history of autoimmune disorders.    RESULTS/DATA     Pulmonary Function Test Results     3/15/2024  Normal  "spirometry. There is no significant bronchodilator response. Lung volumes are normal. Air trapping is present.The DLCO corrected for hemoglobin is normal.6 minute walk test: patient walked 305 meters, no significant desaturation on room air     Chest Radiograph     XR CHEST 1 VIEW 11/22/2019      COMPARISON:  Chest x-ray 11/27/2018, 11/25/2018.      FINDINGS:  The cardiomediastinal silhouette is within normal limits.  There is  no overt vascular congestion.    No focal consolidation, pleural effusion or pneumothorax.    Impression  No acute radiographic finding in the chest.      Chest CT Scan     CT chest 2/27/24: IMPRESSION:  1. 10 mm nodular density in the right lung base, located along a  bandlike opacity and likely a more focal area of atelectasis or  scarring. Recommend however short-term follow-up chest CT in 3 months  to confirm stability or improvement  2. Additional scattered pulmonary nodules measuring up to 5 mm  predominantly in the right lung as described above.  3. Mild upper lung predominant emphysema.  4. Small hiatal hernia.  5. Mild coronary artery calcification. Estimated coronary artery  calcium score 40.  6. Upper abdominal findings as described above.      Echocardiogram     No testing done         REVIEW OF SYSTEMS     REVIEW OF SYSTEMS  Review of Systems   Respiratory:  Positive for cough and shortness of breath.    Cardiovascular:  Positive for palpitations.   Musculoskeletal:  Positive for arthralgias.   All other systems reviewed and are negative.        PHYSICAL EXAM     VITAL SIGNS: There were no vitals taken for this visit. There were no vitals taken for this visit. Pulse 99   Temp 36 °C (96.8 °F) (Temporal)   Resp 18   Ht 1.676 m (5' 6\")   Wt 118 kg (259 lb 3.2 oz)   SpO2 92%   BMI 41.84 kg/m²      CURRENT WEIGHT: [unfilled]  BMI: [unfilled]  PREVIOUS WEIGHTS:  Wt Readings from Last 3 Encounters:   05/10/24 122 kg (268 lb)   04/22/24 122 kg (270 lb)   03/19/24 124 kg (272 lb 6.4 oz) "       Physical Exam  Constitutional:       Appearance: Normal appearance.   HENT:      Head: Normocephalic and atraumatic.      Right Ear: External ear normal.      Left Ear: External ear normal.      Nose: Nose normal.      Mouth/Throat:      Mouth: Mucous membranes are moist.      Pharynx: Oropharynx is clear.   Eyes:      Extraocular Movements: Extraocular movements intact.      Conjunctiva/sclera: Conjunctivae normal.      Pupils: Pupils are equal, round, and reactive to light.   Cardiovascular:      Rate and Rhythm: Normal rate and regular rhythm.      Pulses: Normal pulses.      Heart sounds: Normal heart sounds.   Pulmonary:      Effort: Pulmonary effort is normal.      Breath sounds: Normal breath sounds.   Abdominal:      General: Bowel sounds are normal.      Palpations: Abdomen is soft.   Musculoskeletal:         General: Normal range of motion.      Cervical back: Normal range of motion and neck supple.   Skin:     General: Skin is warm and dry.   Neurological:      General: No focal deficit present.      Mental Status: She is alert and oriented to person, place, and time. Mental status is at baseline.   Psychiatric:         Mood and Affect: Mood normal.         Behavior: Behavior normal.         Thought Content: Thought content normal.         Judgment: Judgment normal.         ASSESSMENT/PLAN     Ms. Gearrd is a 63 y.o. female and  has a past medical history of Personal history of malignant neoplasm, unspecified. She was referred to the [facility] for evaluation of asthma/COPD    Problem List and Orders      Assessment and Plan / Recommendations:  Problem List Items Addressed This Visit    None          COPD/asthma  3/15/2024 pulmonary function test  with Normal spirometry, no BD response, air trapping, normal diffusion  6 minute walk test no desaturation   FENO 6  albuterol hfa 2 puffs or albuterol nebs every 4-6 hours as needed  - symptoms improving with  breztri, cont breztri 2 puffs twice a day, rinse mouth afterwards, since needing albuterol  4 times a day, refilled breztri/albuterol  - will check Ig E, RAST panel and CBC with diff - due to asthma (her father had allergic asthma) - did not obtain     Tobacco abuse - stopped smoking 1 year ago   Given the duration of their cigarettes smoking they meet the eligibility criteria for lung cancer screening with a low-dose CT scan. We discussed the risks and benefits of screening. We discussed the importance of adhering to annual lung cancer screening with this method, the impact of comorbidities, and their ability or willingness to undergo diagnosis and treatment if abnormalities are discovered.    2/27/24 CT chest revealed  10 mm nodular density in the right lung base, located along a bandlike opacity and likely a more focal area of atelectasis or scarring. Repeat testin 5/28/2024 showing resolution but 4mm nodule --->  repeat CT chest in 12 months- 5/29/2025    ABILIO - past history when patient was at this weight, tapered down to nocturnal O2 with weight loss. Now due to weight gain will repeat in lab sleep study- sleep schedule not completed     Hypoxia: Presents to clinic today with O2 sat _94__% on room air. Oxywalk completed. _96_% ambulating on room air. But HR increased 133, no chest pain   - dyspnea - will check echo, message sent to Dr. Lira making him aware of ongoing dyspnea which does not seem to correlate to pulmonary issues    Thank you for visiting the Pulmonary clinic today!       Return to clinic after __3 months  or sooner if needed   Sheri Cuevas CNP  My office number is [phone] -     Any test results will be discussed at next visit -- please make sure to make a follow up appt after testing.

## 2024-06-04 DIAGNOSIS — J44.9 CHRONIC OBSTRUCTIVE PULMONARY DISEASE, UNSPECIFIED COPD TYPE (MULTI): ICD-10-CM

## 2024-06-05 ENCOUNTER — OFFICE VISIT (OUTPATIENT)
Dept: PULMONOLOGY | Facility: CLINIC | Age: 64
End: 2024-06-05
Payer: MEDICARE

## 2024-06-05 VITALS
RESPIRATION RATE: 18 BRPM | HEIGHT: 66 IN | HEART RATE: 99 BPM | TEMPERATURE: 96.8 F | WEIGHT: 259.2 LBS | BODY MASS INDEX: 41.66 KG/M2 | OXYGEN SATURATION: 92 %

## 2024-06-05 DIAGNOSIS — J44.9 CHRONIC OBSTRUCTIVE PULMONARY DISEASE, UNSPECIFIED COPD TYPE (MULTI): Primary | ICD-10-CM

## 2024-06-05 DIAGNOSIS — R91.1 PULMONARY NODULE: ICD-10-CM

## 2024-06-05 DIAGNOSIS — R06.02 SHORTNESS OF BREATH: ICD-10-CM

## 2024-06-05 DIAGNOSIS — G47.34 NOCTURNAL HYPOXIA: ICD-10-CM

## 2024-06-05 DIAGNOSIS — Z72.0 TOBACCO ABUSE DISORDER: ICD-10-CM

## 2024-06-05 PROCEDURE — 99214 OFFICE O/P EST MOD 30 MIN: CPT | Performed by: NURSE PRACTITIONER

## 2024-06-05 PROCEDURE — 1036F TOBACCO NON-USER: CPT | Performed by: NURSE PRACTITIONER

## 2024-06-05 PROCEDURE — 1159F MED LIST DOCD IN RCRD: CPT | Performed by: NURSE PRACTITIONER

## 2024-06-05 RX ORDER — ALBUTEROL SULFATE 90 UG/1
AEROSOL, METERED RESPIRATORY (INHALATION)
Qty: 18 G | Refills: 3 | Status: SHIPPED | OUTPATIENT
Start: 2024-06-05

## 2024-06-05 RX ORDER — BUDESONIDE, GLYCOPYRROLATE, AND FORMOTEROL FUMARATE 160; 9; 4.8 UG/1; UG/1; UG/1
AEROSOL, METERED RESPIRATORY (INHALATION)
Qty: 32.1 G | Refills: 3 | Status: SHIPPED | OUTPATIENT
Start: 2024-06-05

## 2024-06-05 ASSESSMENT — ENCOUNTER SYMPTOMS
DEPRESSION: 0
OCCASIONAL FEELINGS OF UNSTEADINESS: 0
LOSS OF SENSATION IN FEET: 0

## 2024-06-05 ASSESSMENT — COLUMBIA-SUICIDE SEVERITY RATING SCALE - C-SSRS
1. IN THE PAST MONTH, HAVE YOU WISHED YOU WERE DEAD OR WISHED YOU COULD GO TO SLEEP AND NOT WAKE UP?: NO
2. HAVE YOU ACTUALLY HAD ANY THOUGHTS OF KILLING YOURSELF?: NO
6. HAVE YOU EVER DONE ANYTHING, STARTED TO DO ANYTHING, OR PREPARED TO DO ANYTHING TO END YOUR LIFE?: NO

## 2024-06-05 NOTE — PATIENT INSTRUCTIONS
COPD/asthma  3/15/2024 pulmonary function test  with Normal spirometry, no BD response, air trapping, normal diffusion  6 minute walk test no desaturation   FENO 6  albuterol hfa 2 puffs or albuterol nebs every 4-6 hours as needed  - symptoms improving with breztri, cont breztri 2 puffs twice a day, rinse mouth afterwards, since needing albuterol  4 times a day, refilled breztri/albuterol  - will check Ig E, RAST panel and CBC with diff - due to asthma (her father had allergic asthma) - did not obtain     Tobacco abuse - stopped smoking 1 year ago   Given the duration of their cigarettes smoking they meet the eligibility criteria for lung cancer screening with a low-dose CT scan. We discussed the risks and benefits of screening. We discussed the importance of adhering to annual lung cancer screening with this method, the impact of comorbidities, and their ability or willingness to undergo diagnosis and treatment if abnormalities are discovered.    2/27/24 CT chest revealed  10 mm nodular density in the right lung base, located along a bandlike opacity and likely a more focal area of atelectasis or scarring. Repeat testin 5/28/2024 showing resolution but 4mm odule --->  repeat CT chest in 12 months- 5/29/2024    ABILIO - past history when patient was at this weight, tapered down to nocturnal O2 with weight loss. Now due to weight gain will repeat in lab sleep study- sleep schedule not completed     Hypoxia: Presents to clinic today with O2 sat _94__% on room air. Oxywalk completed. _96_% ambulating on room air. But HR increased 133, no chest pain   - dyspnea - will check echo    Thank you for visiting the Pulmonary clinic today!       Return to clinic after __3 months  or sooner if needed   Sheri Cuevas CNP  My office number is [phone] -     Any test results will be discussed at next visit -- please make sure to make a follow up appt after testing.

## 2024-06-07 PROBLEM — R41.89 UNRESPONSIVE: Status: ACTIVE | Noted: 2018-11-16

## 2024-06-07 PROBLEM — S82.64XD: Status: ACTIVE | Noted: 2018-12-10

## 2024-06-07 PROBLEM — R11.2 NAUSEA AND VOMITING: Status: ACTIVE | Noted: 2024-06-07

## 2024-06-07 PROBLEM — R35.0 FREQUENCY OF MICTURITION: Status: ACTIVE | Noted: 2018-10-22

## 2024-06-07 PROBLEM — J96.91 RESPIRATORY FAILURE, UNSPECIFIED WITH HYPOXIA (MULTI): Status: ACTIVE | Noted: 2022-09-16

## 2024-06-10 ENCOUNTER — OFFICE VISIT (OUTPATIENT)
Dept: PRIMARY CARE | Facility: CLINIC | Age: 64
End: 2024-06-10
Payer: MEDICARE

## 2024-06-10 VITALS
SYSTOLIC BLOOD PRESSURE: 110 MMHG | DIASTOLIC BLOOD PRESSURE: 66 MMHG | HEART RATE: 78 BPM | BODY MASS INDEX: 41.46 KG/M2 | TEMPERATURE: 97 F | WEIGHT: 258 LBS | HEIGHT: 66 IN

## 2024-06-10 DIAGNOSIS — E66.01 OBESITY, CLASS III, BMI 40-49.9 (MORBID OBESITY) (MULTI): ICD-10-CM

## 2024-06-10 DIAGNOSIS — Z71.3 WEIGHT LOSS COUNSELING, ENCOUNTER FOR: Primary | ICD-10-CM

## 2024-06-10 PROCEDURE — 1036F TOBACCO NON-USER: CPT | Performed by: FAMILY MEDICINE

## 2024-06-10 PROCEDURE — 1159F MED LIST DOCD IN RCRD: CPT | Performed by: FAMILY MEDICINE

## 2024-06-10 PROCEDURE — 99213 OFFICE O/P EST LOW 20 MIN: CPT | Performed by: FAMILY MEDICINE

## 2024-06-10 RX ORDER — PHENTERMINE HYDROCHLORIDE 37.5 MG/1
37.5 TABLET ORAL
Qty: 30 TABLET | Refills: 0 | Status: SHIPPED | OUTPATIENT
Start: 2024-06-10 | End: 2024-07-10

## 2024-06-10 NOTE — PROGRESS NOTES
Patient is here for 1 month follow-up.  She is an excellent is down 10 pounds.  No issues problems or complaints and is feeling well.    REVIEW OF SYSTEMS: 12 systems negative except for those mentioned in the HPI.    PHYSICAL EXAMINATION:   Constitutional: The patient is alert, in no acute  distress.  Eyes: Extraocular movements are intact.   ENT: external ear canals patent  Neck: no  JVD.  Heart: no JVD  Lungs: Normal respiration without stridor or nasal flaring   Psychiatric: Good judgment and insight. Normal affect and mood.  Skin: no rashes or lesions    Assessment:  per EMR    Plan:  OARRS is reviewed and appropriate.  Patient is an excellent medical weight loss will continue we will follow-up in 1 month.  No other issues problems or complaints.  Mood has been stable breathing has been stable as well as blood pressure    This dictation was created using Dragon dictation and may contain errors

## 2024-07-10 ENCOUNTER — APPOINTMENT (OUTPATIENT)
Dept: PRIMARY CARE | Facility: CLINIC | Age: 64
End: 2024-07-10
Payer: MEDICARE

## 2024-07-10 VITALS
HEIGHT: 66 IN | BODY MASS INDEX: 40.5 KG/M2 | SYSTOLIC BLOOD PRESSURE: 104 MMHG | DIASTOLIC BLOOD PRESSURE: 72 MMHG | WEIGHT: 252 LBS | HEART RATE: 99 BPM

## 2024-07-10 DIAGNOSIS — E03.9 ACQUIRED HYPOTHYROIDISM: ICD-10-CM

## 2024-07-10 DIAGNOSIS — E66.01 OBESITY, CLASS III, BMI 40-49.9 (MORBID OBESITY) (MULTI): ICD-10-CM

## 2024-07-10 DIAGNOSIS — J45.20 MILD INTERMITTENT ASTHMA WITHOUT COMPLICATION (HHS-HCC): ICD-10-CM

## 2024-07-10 DIAGNOSIS — J43.2 CENTRILOBULAR EMPHYSEMA (MULTI): ICD-10-CM

## 2024-07-10 DIAGNOSIS — Z71.3 WEIGHT LOSS COUNSELING, ENCOUNTER FOR: Primary | ICD-10-CM

## 2024-07-10 PROCEDURE — 1036F TOBACCO NON-USER: CPT | Performed by: FAMILY MEDICINE

## 2024-07-10 PROCEDURE — 1124F ACP DISCUSS-NO DSCNMKR DOCD: CPT | Performed by: FAMILY MEDICINE

## 2024-07-10 PROCEDURE — 1159F MED LIST DOCD IN RCRD: CPT | Performed by: FAMILY MEDICINE

## 2024-07-10 PROCEDURE — 99213 OFFICE O/P EST LOW 20 MIN: CPT | Performed by: FAMILY MEDICINE

## 2024-07-10 RX ORDER — PHENTERMINE HYDROCHLORIDE 37.5 MG/1
37.5 TABLET ORAL
Qty: 30 TABLET | Refills: 0 | Status: SHIPPED | OUTPATIENT
Start: 2024-07-10 | End: 2024-08-09

## 2024-07-10 ASSESSMENT — PATIENT HEALTH QUESTIONNAIRE - PHQ9
2. FEELING DOWN, DEPRESSED OR HOPELESS: NOT AT ALL
SUM OF ALL RESPONSES TO PHQ9 QUESTIONS 1 AND 2: 0
1. LITTLE INTEREST OR PLEASURE IN DOING THINGS: NOT AT ALL

## 2024-07-10 NOTE — PROGRESS NOTES
Patient is here for 1 month follow-up medical weight loss is done  She is down under 7 pounds.  No issues problems or complaints.  Breathing has been a little harder.     REVIEW OF SYSTEMS: 12 systems negative except for those mentioned in the HPI.    PHYSICAL EXAMINATION:   Constitutional: The patient is alert, in no acute  distress.  Eyes: Extraocular movements are intact.   ENT: external ear canals patent  Neck: no  JVD.  Heart: no JVD  Lungs: Normal respiration without stridor or nasal flaring   Psychiatric: Good judgment and insight. Normal affect and mood.  Skin: no rashes or lesions    Assessment:  per EMR    Plan:  OARRS reviewed and appropriate.  Patient to  .  Can also use the albuterol as well and DuoNeb before going out also watch the humidity of the breathing    This dictation was created using Dragon dictation and may contain errors

## 2024-07-29 DIAGNOSIS — E03.9 ACQUIRED HYPOTHYROIDISM: ICD-10-CM

## 2024-07-30 RX ORDER — LEVOTHYROXINE SODIUM 100 UG/1
100 TABLET ORAL DAILY
Qty: 90 TABLET | Refills: 0 | Status: SHIPPED | OUTPATIENT
Start: 2024-07-30

## 2024-08-08 ENCOUNTER — APPOINTMENT (OUTPATIENT)
Dept: PRIMARY CARE | Facility: CLINIC | Age: 64
End: 2024-08-08
Payer: MEDICARE

## 2024-08-08 VITALS
TEMPERATURE: 97 F | DIASTOLIC BLOOD PRESSURE: 70 MMHG | BODY MASS INDEX: 40.02 KG/M2 | WEIGHT: 249 LBS | HEART RATE: 78 BPM | SYSTOLIC BLOOD PRESSURE: 104 MMHG | HEIGHT: 66 IN

## 2024-08-08 DIAGNOSIS — Z71.3 WEIGHT LOSS COUNSELING, ENCOUNTER FOR: ICD-10-CM

## 2024-08-08 PROCEDURE — 99213 OFFICE O/P EST LOW 20 MIN: CPT | Performed by: FAMILY MEDICINE

## 2024-08-08 PROCEDURE — 1159F MED LIST DOCD IN RCRD: CPT | Performed by: FAMILY MEDICINE

## 2024-08-08 PROCEDURE — 1036F TOBACCO NON-USER: CPT | Performed by: FAMILY MEDICINE

## 2024-08-08 PROCEDURE — 3008F BODY MASS INDEX DOCD: CPT | Performed by: FAMILY MEDICINE

## 2024-08-08 RX ORDER — PREDNISOLONE ACETATE 10 MG/ML
SUSPENSION/ DROPS OPHTHALMIC
COMMUNITY
Start: 2024-07-25

## 2024-08-08 RX ORDER — PHENTERMINE HYDROCHLORIDE 37.5 MG/1
37.5 TABLET ORAL
Qty: 30 TABLET | Refills: 0 | Status: SHIPPED | OUTPATIENT
Start: 2024-08-08 | End: 2024-09-07

## 2024-08-08 RX ORDER — MOXIFLOXACIN 5 MG/ML
SOLUTION/ DROPS OPHTHALMIC
COMMUNITY
Start: 2024-07-25

## 2024-08-08 NOTE — PROGRESS NOTES
Patient is here 1 month follow-up medical weight loss she has not lost a whole lot but is still been down 3 pounds she is still excellent.    REVIEW OF SYSTEMS: 12 systems negative except for those mentioned in the HPI.    PHYSICAL EXAMINATION:   Constitutional: The patient is alert, in no acute  distress.  Eyes: Extraocular movements are intact.   ENT: external ear canals patent  Neck: no  JVD.  Heart: no JVD  Lungs: Normal respiration without stridor or nasal flaring   Psychiatric: Good judgment and insight. Normal affect and mood.  Skin: no rashes or lesions    Assessment:  per EMR    Plan:  OARRS reviewed appropriate.  Patient has met the 5% requirement and only needs to lose under 4 pounds for next month at 245.  Blood pressure is also well-controlled as well as also mood    This dictation was created using Dragon dictation and may contain errors

## 2024-08-20 ENCOUNTER — HOSPITAL ENCOUNTER (OUTPATIENT)
Dept: CARDIOLOGY | Facility: HOSPITAL | Age: 64
Discharge: HOME | End: 2024-08-20
Payer: MEDICARE

## 2024-08-20 DIAGNOSIS — R06.02 SHORTNESS OF BREATH: ICD-10-CM

## 2024-08-20 LAB
AORTIC VALVE PEAK VELOCITY: 1.28 M/S
AV PEAK GRADIENT: 6.6 MMHG
AVA (PEAK VEL): 2.44 CM2
EJECTION FRACTION APICAL 4 CHAMBER: 54.7
EJECTION FRACTION: 58 %
LEFT ATRIUM VOLUME AREA LENGTH INDEX BSA: 11.9 ML/M2
LEFT VENTRICLE INTERNAL DIMENSION DIASTOLE: 3.9 CM (ref 3.5–6)
LEFT VENTRICULAR OUTFLOW TRACT DIAMETER: 1.9 CM
LV EJECTION FRACTION BIPLANE: 52 %
MITRAL VALVE E/A RATIO: 0.79
RIGHT VENTRICLE FREE WALL PEAK S': 10.9 CM/S
RIGHT VENTRICLE PEAK SYSTOLIC PRESSURE: 16.2 MMHG
TRICUSPID ANNULAR PLANE SYSTOLIC EXCURSION: 2.3 CM

## 2024-08-20 PROCEDURE — 93306 TTE W/DOPPLER COMPLETE: CPT

## 2024-08-20 PROCEDURE — 93306 TTE W/DOPPLER COMPLETE: CPT | Performed by: NURSE PRACTITIONER

## 2024-09-12 ENCOUNTER — APPOINTMENT (OUTPATIENT)
Dept: PRIMARY CARE | Facility: CLINIC | Age: 64
End: 2024-09-12
Payer: MEDICARE

## 2024-10-01 ENCOUNTER — APPOINTMENT (OUTPATIENT)
Dept: PRIMARY CARE | Facility: CLINIC | Age: 64
End: 2024-10-01
Payer: MEDICARE

## 2024-10-01 VITALS
BODY MASS INDEX: 40.02 KG/M2 | WEIGHT: 249 LBS | TEMPERATURE: 95.5 F | HEIGHT: 66 IN | SYSTOLIC BLOOD PRESSURE: 111 MMHG | DIASTOLIC BLOOD PRESSURE: 79 MMHG | HEART RATE: 75 BPM

## 2024-10-01 DIAGNOSIS — E66.01 OBESITY, CLASS III, BMI 40-49.9 (MORBID OBESITY) (MULTI): ICD-10-CM

## 2024-10-01 DIAGNOSIS — E03.9 ACQUIRED HYPOTHYROIDISM: ICD-10-CM

## 2024-10-01 DIAGNOSIS — Z71.3 WEIGHT LOSS COUNSELING, ENCOUNTER FOR: ICD-10-CM

## 2024-10-01 DIAGNOSIS — L20.84 INTRINSIC ECZEMA: ICD-10-CM

## 2024-10-01 DIAGNOSIS — R73.03 PREDIABETES: ICD-10-CM

## 2024-10-01 DIAGNOSIS — H61.21 EXCESSIVE EAR WAX, RIGHT: Primary | ICD-10-CM

## 2024-10-01 PROCEDURE — 69210 REMOVE IMPACTED EAR WAX UNI: CPT | Performed by: FAMILY MEDICINE

## 2024-10-01 PROCEDURE — 3008F BODY MASS INDEX DOCD: CPT | Performed by: FAMILY MEDICINE

## 2024-10-01 PROCEDURE — 1036F TOBACCO NON-USER: CPT | Performed by: FAMILY MEDICINE

## 2024-10-01 PROCEDURE — 1159F MED LIST DOCD IN RCRD: CPT | Performed by: FAMILY MEDICINE

## 2024-10-01 PROCEDURE — 99214 OFFICE O/P EST MOD 30 MIN: CPT | Performed by: FAMILY MEDICINE

## 2024-10-01 RX ORDER — FLUOCINOLONE ACETONIDE 0.11 MG/ML
5 OIL AURICULAR (OTIC) 2 TIMES DAILY
Qty: 10 ML | Refills: 11 | Status: SHIPPED | OUTPATIENT
Start: 2024-10-01

## 2024-10-01 RX ORDER — PHENTERMINE HYDROCHLORIDE 37.5 MG/1
37.5 TABLET ORAL
Qty: 30 TABLET | Refills: 0 | Status: SHIPPED | OUTPATIENT
Start: 2024-10-01 | End: 2024-10-31

## 2024-10-01 RX ORDER — KETOROLAC TROMETHAMINE 5 MG/ML
SOLUTION OPHTHALMIC
COMMUNITY
Start: 2024-08-16

## 2024-10-01 ASSESSMENT — PATIENT HEALTH QUESTIONNAIRE - PHQ9
1. LITTLE INTEREST OR PLEASURE IN DOING THINGS: NOT AT ALL
2. FEELING DOWN, DEPRESSED OR HOPELESS: NOT AT ALL
SUM OF ALL RESPONSES TO PHQ9 QUESTIONS 1 AND 2: 0

## 2024-10-01 NOTE — PROGRESS NOTES
Patient is here for follow-up of medical weight loss.  She had been on back on Adipex for 2 months.  She has not lost anything she has been on the medication for about 1-1/2 2 weeks.  She also is having some issues hearing out of the right ear and the hydroperoxide Bothers her eczema.    REVIEW OF SYSTEMS: 12 systems negative except for those mentioned in the HPI.    PHYSICAL EXAMINATION:   Constitutional: The patient is alert, in no acute  distress.  Eyes: Extraocular movements are intact.   ENT: Right TM is completely occluded with some wax.  With patient's permission is cannulated and actually is not wax but is actually dead skin that is completely occluding.  This is removed with no complications  Neck: no  JVD.  Heart: no JVD  Lungs: Normal respiration without stridor or nasal flaring   Psychiatric: Good judgment and insight. Normal affect and mood.  Skin: no rashes or lesions    Assessment:  per EMR    Plan:  OARRS reviewed appropriate.  He patient is not actually at the 3-month isauro and does need to lose 4 pounds next month.  Will come back for that.  Also discussed for the eczema in the ears steroid lotion.  Hypertension is well-controlled as well as depression and anxiety    This dictation was created using Dragon dictation and may contain errors

## 2024-10-07 ENCOUNTER — APPOINTMENT (OUTPATIENT)
Dept: PULMONOLOGY | Facility: CLINIC | Age: 64
End: 2024-10-07
Payer: COMMERCIAL

## 2024-10-30 ASSESSMENT — ENCOUNTER SYMPTOMS
SHORTNESS OF BREATH: 1
COUGH: 1
ARTHRALGIAS: 1
PALPITATIONS: 1

## 2024-10-31 ENCOUNTER — APPOINTMENT (OUTPATIENT)
Dept: PULMONOLOGY | Facility: CLINIC | Age: 64
End: 2024-10-31
Payer: COMMERCIAL

## 2024-10-31 ENCOUNTER — TELEPHONE (OUTPATIENT)
Dept: PULMONOLOGY | Facility: CLINIC | Age: 64
End: 2024-10-31

## 2024-10-31 VITALS
DIASTOLIC BLOOD PRESSURE: 82 MMHG | WEIGHT: 293 LBS | HEIGHT: 66 IN | BODY MASS INDEX: 47.09 KG/M2 | HEART RATE: 95 BPM | TEMPERATURE: 97 F | OXYGEN SATURATION: 88 % | SYSTOLIC BLOOD PRESSURE: 112 MMHG

## 2024-10-31 DIAGNOSIS — G47.30 SLEEP APNEA, UNSPECIFIED TYPE: ICD-10-CM

## 2024-10-31 DIAGNOSIS — R09.02 HYPOXIA: ICD-10-CM

## 2024-10-31 DIAGNOSIS — G47.34 NOCTURNAL HYPOXIA: ICD-10-CM

## 2024-10-31 DIAGNOSIS — Z72.0 TOBACCO ABUSE DISORDER: Primary | ICD-10-CM

## 2024-10-31 DIAGNOSIS — J45.20 MILD INTERMITTENT ASTHMA, UNSPECIFIED WHETHER COMPLICATED (HHS-HCC): ICD-10-CM

## 2024-10-31 DIAGNOSIS — R91.1 PULMONARY NODULE: ICD-10-CM

## 2024-10-31 DIAGNOSIS — J44.9 CHRONIC OBSTRUCTIVE PULMONARY DISEASE, UNSPECIFIED COPD TYPE (MULTI): ICD-10-CM

## 2024-10-31 ASSESSMENT — ENCOUNTER SYMPTOMS
LOSS OF SENSATION IN FEET: 0
DEPRESSION: 0
OCCASIONAL FEELINGS OF UNSTEADINESS: 0

## 2024-10-31 ASSESSMENT — PATIENT HEALTH QUESTIONNAIRE - PHQ9
1. LITTLE INTEREST OR PLEASURE IN DOING THINGS: NOT AT ALL
SUM OF ALL RESPONSES TO PHQ9 QUESTIONS 1 AND 2: 0
2. FEELING DOWN, DEPRESSED OR HOPELESS: NOT AT ALL

## 2024-11-01 ENCOUNTER — APPOINTMENT (OUTPATIENT)
Dept: PRIMARY CARE | Facility: CLINIC | Age: 64
End: 2024-11-01
Payer: MEDICARE

## 2024-11-01 VITALS
HEART RATE: 92 BPM | DIASTOLIC BLOOD PRESSURE: 74 MMHG | BODY MASS INDEX: 39.86 KG/M2 | WEIGHT: 248 LBS | TEMPERATURE: 97.3 F | HEIGHT: 66 IN | SYSTOLIC BLOOD PRESSURE: 122 MMHG

## 2024-11-01 DIAGNOSIS — Z71.3 WEIGHT LOSS COUNSELING, ENCOUNTER FOR: Primary | ICD-10-CM

## 2024-11-01 DIAGNOSIS — F17.200 CURRENT SMOKER: ICD-10-CM

## 2024-11-01 DIAGNOSIS — E03.9 ACQUIRED HYPOTHYROIDISM: ICD-10-CM

## 2024-11-01 DIAGNOSIS — J96.91 RESPIRATORY FAILURE WITH HYPOXIA, UNSPECIFIED CHRONICITY (MULTI): ICD-10-CM

## 2024-11-01 DIAGNOSIS — E66.01 OBESITY, CLASS III, BMI 40-49.9 (MORBID OBESITY) (MULTI): ICD-10-CM

## 2024-11-01 DIAGNOSIS — F31.30 BIPOLAR DISORDER, MOST RECENT EPISODE DEPRESSED (MULTI): ICD-10-CM

## 2024-11-01 PROCEDURE — 99213 OFFICE O/P EST LOW 20 MIN: CPT | Performed by: FAMILY MEDICINE

## 2024-11-01 PROCEDURE — G2211 COMPLEX E/M VISIT ADD ON: HCPCS | Performed by: FAMILY MEDICINE

## 2024-11-01 PROCEDURE — 1159F MED LIST DOCD IN RCRD: CPT | Performed by: FAMILY MEDICINE

## 2024-11-01 PROCEDURE — 3008F BODY MASS INDEX DOCD: CPT | Performed by: FAMILY MEDICINE

## 2025-01-02 ENCOUNTER — APPOINTMENT (OUTPATIENT)
Dept: PRIMARY CARE | Facility: CLINIC | Age: 65
End: 2025-01-02
Payer: COMMERCIAL

## 2025-01-02 VITALS
BODY MASS INDEX: 42.11 KG/M2 | SYSTOLIC BLOOD PRESSURE: 118 MMHG | TEMPERATURE: 96 F | HEART RATE: 75 BPM | DIASTOLIC BLOOD PRESSURE: 72 MMHG | HEIGHT: 66 IN | WEIGHT: 262 LBS

## 2025-01-02 DIAGNOSIS — F17.200 CURRENT SMOKER: ICD-10-CM

## 2025-01-02 DIAGNOSIS — E03.9 ACQUIRED HYPOTHYROIDISM: ICD-10-CM

## 2025-01-02 DIAGNOSIS — E66.01 OBESITY, CLASS III, BMI 40-49.9 (MORBID OBESITY) (MULTI): ICD-10-CM

## 2025-01-02 DIAGNOSIS — F51.01 PRIMARY INSOMNIA: ICD-10-CM

## 2025-01-02 DIAGNOSIS — R73.03 PREDIABETES: ICD-10-CM

## 2025-01-02 DIAGNOSIS — E55.9 VITAMIN D DEFICIENCY: ICD-10-CM

## 2025-01-02 DIAGNOSIS — F43.10 PTSD (POST-TRAUMATIC STRESS DISORDER): ICD-10-CM

## 2025-01-02 DIAGNOSIS — Z71.3 WEIGHT LOSS COUNSELING, ENCOUNTER FOR: Primary | ICD-10-CM

## 2025-01-02 DIAGNOSIS — M54.30 SCIATIC LEG PAIN: ICD-10-CM

## 2025-01-02 DIAGNOSIS — E78.2 MIXED HYPERLIPIDEMIA: ICD-10-CM

## 2025-01-02 DIAGNOSIS — R73.9 HYPERGLYCEMIA: ICD-10-CM

## 2025-01-02 DIAGNOSIS — Z00.00 WELLNESS EXAMINATION: ICD-10-CM

## 2025-01-02 PROCEDURE — 3008F BODY MASS INDEX DOCD: CPT | Performed by: FAMILY MEDICINE

## 2025-01-02 PROCEDURE — G2211 COMPLEX E/M VISIT ADD ON: HCPCS | Performed by: FAMILY MEDICINE

## 2025-01-02 PROCEDURE — 99214 OFFICE O/P EST MOD 30 MIN: CPT | Performed by: FAMILY MEDICINE

## 2025-01-02 PROCEDURE — 1159F MED LIST DOCD IN RCRD: CPT | Performed by: FAMILY MEDICINE

## 2025-01-02 RX ORDER — ESCITALOPRAM OXALATE 20 MG/1
20 TABLET ORAL DAILY
COMMUNITY
Start: 2024-11-04

## 2025-01-02 RX ORDER — ATORVASTATIN CALCIUM 40 MG/1
40 TABLET, FILM COATED ORAL DAILY
Qty: 90 TABLET | Refills: 3 | Status: SHIPPED | OUTPATIENT
Start: 2025-01-02

## 2025-01-02 RX ORDER — LORAZEPAM 0.5 MG/1
0.5 TABLET ORAL EVERY 6 HOURS PRN
COMMUNITY
Start: 2024-11-04

## 2025-01-02 RX ORDER — LEVOTHYROXINE SODIUM 100 UG/1
100 TABLET ORAL DAILY
Qty: 90 TABLET | Refills: 1 | Status: SHIPPED | OUTPATIENT
Start: 2025-01-02

## 2025-01-02 RX ORDER — PHENTERMINE HYDROCHLORIDE 37.5 MG/1
37.5 TABLET ORAL
Qty: 30 TABLET | Refills: 0 | Status: SHIPPED | OUTPATIENT
Start: 2025-01-02 | End: 2025-02-01

## 2025-01-02 NOTE — PROGRESS NOTES
Patient is here 2-month follow-up of medical weight loss.  She has rebound a little bit is not gone all the way back up to 290.  She has also had some adjustment in her psychiatric medications for the holidays but does feel that she is doing well.  She has a refill of her cholesterol medication as well as also her diabetic medication otherwise has been stable.  She also wanted to ask about sciatica.  She was having some pain radiating down into her right leg but she started taking the meloxicam again and it seems to have taken care of it.  No loss of function    REVIEW OF SYSTEMS: 12 systems negative except for those mentioned in the HPI.    PHYSICAL EXAMINATION:   Constitutional: The patient is alert, in no acute  distress.  Eyes: Extraocular movements are intact.   ENT: external ear canals patent  Neck: no  JVD.  Heart: no JVD  Lungs: Normal respiration without stridor or nasal flaring   Psychiatric: Good judgment and insight. Normal affect and mood.  Skin: no rashes or lesions    Assessment:  per EMR    Plan:  OARRS reviewed appropriate.  Will go back on Adipex.  Other prediabetes recheck A1c as well as CBC CMP thyroid needs to be rechecked in 10 months continue medication currently.  Continue current cholesterol medication recheck cholesterol.  Also discussed referral to pain management or physical therapy if necessary.  Patient Clines both of those today would rather focus on the weight loss as well as also the anti-inflammatory which is effective and is resolved her discomfort    This dictation was created using Dragon dictation and may contain errors

## 2025-01-30 ENCOUNTER — APPOINTMENT (OUTPATIENT)
Dept: PULMONOLOGY | Facility: CLINIC | Age: 65
End: 2025-01-30
Payer: MEDICARE

## 2025-01-30 ASSESSMENT — ENCOUNTER SYMPTOMS
ARTHRALGIAS: 1
PALPITATIONS: 1
COUGH: 1
SHORTNESS OF BREATH: 1

## 2025-01-30 NOTE — PROGRESS NOTES
Patient: Etta Gerard    [id number]  : 1960 -- AGE 64 y.o.    Provider: Sheri Cuevas CNP     Location [facility]   Service Date:   10/31/2024            [facility]  New Visit Note      HISTORY OF PRESENT ILLNESS     The patient's referring provider is:Dr Lira, previosly seen by Miko    HISTORY OF PRESENT ILLNESS   Etta Gerard is a 64 y.o. female who presents to a [facility] for an evaluation with concerns of No chief complaint on file.. I have independently interviewed and examined the patient in the office and reviewed available records.    Current History 10/31/2024    On today's visit, the patient reports a little coughing but not much. Occ productive with white phlegm. She wakes up feeling congested and wheezes then then clear her throat  NO wheezing. BUSTILLO continues - she is dyspneic after a couple minutes of walking. Able to complete chores at home.   Denies chest pain.   NO ER visits  Denies heartburn  Denies throat clearing  Denies nighttime symptoms  Restarted smoking 1 ppd X 1 WEEK having cravings           Current History 2024    On today's visit, the patient reports no ER visits. No cough,       Her breathing is getting worse, she is feeling more dyspnenic after a couple minutes. Able to do chores at home.  She becomes very SOB. She is using albuterol a couple times a day.   Denies cough, wheeze, Fevers/chills, BUSTILLO, SOB at rest, chest pain  Allergies - c/o runny nose but denies  post nasal drip   GERD denies   Night time - denies   She wears 3 liters at bedtime not when walking does not have portable unit.         Current History  3/19/2024    On today's visit, the patient reports she started breztri and she could breathe a lot better. She could breathe in better, She clears her throat a lot but does not cough. Her shortness of breath is better, she is not gasping for air. A little wheezing.  Denies Fevers/chills chest pain or GERD,    She wears oxygen at night.  She has brought in her oxygen mask to show which is better. She has a dog who bites everyone and she does not have anyone to watch her dog  Allergies denies runny nose or post nasal drip   GERD denies   ED visits denies   Up to date on vaccines - did not get Flu vaccine or COVID booster  Night time - has cough at night  C/o unrested sleep.   She did not use Breztri today        Current History 2/5/2024     On today's visit, the patient reports she wears 3 liters while sleeping. No am headaches. She has restful sleep, no daytime sleepiness. She had CPAP for ABILIO and she lost 100lbs and was able to taper down to nocturnal oxygenation via cannula. Now she has gained 70lbs due to smoking cessation.   At baseline,  has dyspnea on exertion, but  none at rest, but conversational dyspnea. Symptoms started many years ago, but has mostly been stable.  Currently sits for most of the day, works inside the house, but does not carry loads and do strenuous exercise.   Has to stop for breath after walking about 100 meters or a few minutes (mMRC 3).   Relates orthopnea, denies pnd/aidan.  Has gained X 40 pounds in the last X 12 months, since stopped smoking 11 months ago, used patch, nicotine gum.  Improving chronic cough with clear phlegm, C/o  wheezing, and denies, green, blood streaks. No night cough. No hemoptysis. No fever or shivering chills. Has no runny nose, or a tingling sensation in the back of his throat.  Denies chest pain or heartburn.     Previous pulmonary history:  no history of recurrent infections, or lung disease as a child.  No previous lung hx, never on oxygen or inhaler therapy.    previously was told they have asthma.  currently is on 2-3 liters at night supplemental oxygen. No exacerbations this year    Inhalers/nebulized medications: stiolto - been on 5 years and using albuterol 4 times a day consistently    Hospitalization History: Not  been hospitalized over the last year for breathing related problem.    Sleep history:  Complains of snoring, has dreams about apnea, feeling tired during the day, and taking naps during the day.   She was tested and used CPAP. She lost 100 lbs then improved.     ALLERGIES AND MEDICATIONS     ALLERGIES  Allergies   Allergen Reactions    Codeine Unknown    Prednisolone Other    Prednisone Unknown       MEDICATIONS  Current Outpatient Medications   Medication Sig Dispense Refill    albuterol 90 mcg/actuation inhaler INHALE 1 PUFF BY MOUTH EVERY 4 HOURS AS NEEDED FOR COUGH OR WHEEZING 18 g 3    ARIPiprazole (Abilify) 15 mg tablet Take 1 tablet (15 mg) by mouth once daily.      atorvastatin (Lipitor) 40 mg tablet Take 1 tablet (40 mg) by mouth once daily. 90 tablet 3    Breztri Aerosphere 160-9-4.8 mcg/actuation HFA aerosol inhaler USE 2 INHALATIONS BY MOUTH TWICE DAILY. RINSE MOUTH WITH WATER  AFTER USE TO REDUCE AFTERTASE  AND INCIDENCE OF CANDIDIASIS. DO NOT SWALLOW 32.1 g 3    doxepin (SINEquan) 25 mg capsule Take 1 capsule (25 mg) by mouth.      ergocalciferol (Vitamin D-2) 1.25 MG (14200 UT) capsule Take 1 capsule (1,250 mcg) by mouth 1 (one) time per week.      escitalopram (Lexapro) 20 mg tablet Take 1 tablet (20 mg) by mouth once daily.      fluocinolone (DermOtic) 0.01 % ear drops Administer 5 drops into each ear 2 times a day. 10 mL 11    ipratropium-albuteroL (Duo-Neb) 0.5-2.5 mg/3 mL nebulizer solution Inhale once daily. PRN      ketorolac (Acular) 0.5 % ophthalmic solution INSTILL 1 DROP INTO LEFT EYE 4 TIMES A DAY      levothyroxine (Synthroid, Levoxyl) 100 mcg tablet Take 1 tablet (100 mcg) by mouth once daily. 90 tablet 1    LORazepam (Ativan) 0.5 mg tablet Take 1 tablet (0.5 mg) by mouth every 6 hours if needed.      moxifloxacin (Vigamox) 0.5 % ophthalmic solution INSTILL 1 DROP IN AFFECTED EYE 4 TIMES A DAY AS DIRECTED      phentermine (Adipex-P) 37.5 mg tablet Take 1 tablet (37.5 mg) by mouth once  daily in the morning. Take before meals. 30 tablet 0    prednisoLONE acetate (Pred-Forte) 1 % ophthalmic suspension INSTILL 1 DROP INTO LEFT EYE 4 TIMES A DAY      zolpidem (Ambien) 10 mg tablet Take 1 tablet (10 mg) by mouth once daily as needed.       No current facility-administered medications for this visit.         PAST HISTORY     PAST MEDICAL HISTORY  She  has a past medical history of Personal history of malignant neoplasm, unspecified.hypothyroidism HLD, endometrial cancer    PAST SURGICAL HISTORY  Past Surgical History:   Procedure Laterality Date    CATARACT EXTRACTION Left 07/24/2024    OTHER SURGICAL HISTORY  05/20/2019    Colon surgery    OTHER SURGICAL HISTORY  05/20/2019    Hysterectomy    OTHER SURGICAL HISTORY  05/20/2019    Foot surgery    OTHER SURGICAL HISTORY  05/21/2019    Tonsillectomy    OTHER SURGICAL HISTORY  05/21/2019    Cholecystectomy    OTHER SURGICAL HISTORY  01/16/2022    Tibia fracture repair       IMMUNIZATION HISTORY  Immunization History   Administered Date(s) Administered    COVID-19, mRNA, LNP-S, PF, 30 mcg/0.3 mL dose 07/22/2021, 08/12/2021    Flu vaccine (IIV4), preservative free *Check age/dose* 09/18/2017, 10/22/2018, 10/08/2019, 11/06/2020, 11/02/2021, 09/20/2022    Influenza, Unspecified 09/20/2022    Influenza, seasonal, injectable 10/10/2018    Pneumococcal polysaccharide vaccine, 23-valent, age 2 years and older (PNEUMOVAX 23) 10/22/2018    Tdap vaccine, age 7 year and older (BOOSTRIX, ADACEL) 09/18/2017    Zoster vaccine, recombinant, adult (SHINGRIX) 02/22/2022, 08/09/2022       SOCIAL HISTORY  She  reports that she has been smoking cigarettes. She started smoking about 48 years ago. She has a 92 pack-year smoking history. She has been exposed to tobacco smoke. She has never used smokeless tobacco. She reports that she does not currently use alcohol. She reports that she does not use drugs. She Patient     OCCUPATIONAL/ENVIRONMENTAL HISTORY  Previously worked as:     DOES/DOES NOT EC: does not have known exposure to asbestos, silica, beryllium or inhaled metals.  DOES/DOES NOT EC: does have exposure to birds or exotic animals. 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  DOES/DOES NOT EC: does have a family history of cancer.  DOES/DOES NOT EC: does not have a family history of autoimmune disorders.    RESULTS/DATA     Pulmonary Function Test Results        Complete Pulmonary Function Test Pre/Post Bronchodialator (Spirometry Pre/Post/DLCO/Lung Volumes) 3/15/2024  Status: Final result     Study Result    Narrative & Impression   Normal spirometry. There is no significant bronchodilator response. Lung volumes are normal. Air trapping is present.The DLCO corrected for hemoglobin is normal.6 minute walk test: patient walked 305 meters, no significant desaturation on room air     Scans on Order [id number]      Pulmonary Function Test - Scan on 3/15/2024  1:59 PM                        Pulmonary Function Test - Scan on 3/19/2024  1:52 AM                          Chest Radiograph     CHEST 1 VIEW; 11/22/2019      INDICATION:  Shortness of breath     COMPARISON:  Chest x-ray 11/27/2018, 11/25/2018.     ACCESSION NUMBER(S):  [id number]     ORDERING CLINICIAN:  ALYSE HARRINGTON     TECHNIQUE:  Portable upright frontal view of the chest was obtained .     FINDINGS:  The cardiomediastinal silhouette is within normal limits.  There is  no overt vascular congestion.     No focal consolidation, pleural effusion or pneumothorax.        IMPRESSION:  No acute radiographic finding in the chest.      Chest CT Scan      CT LUNG SCREENING FOLLOW UP CT CHEST WO CONTRAST;  5/28/2024          IMPRESSION:  1. There has been interval resolution of the right lower lobe nodular  focus, most consistent with improving inflammatory/atelectatic  changes.  2. Recommend return to 1 year low-dose chest CT for surveillance.        Echocardiogram     TRANSTHORACIC ECHOCARDIOGRAM REPORT 8/20/2024     Patient Name:      LENO PORRAS       Reading Physician:    [id number] Fahad Denis MD  Study Date:        8/20/2024            Ordering Provider:    [id number] PARESH JUSTIN          PHYSICIAN INTERPRETATION:  Left Ventricle: Left ventricular ejection fraction is normal, by visual estimate at 55-60%. There are no regional wall motion abnormalities. The left ventricular cavity size is normal. Spectral Doppler shows a normal pattern of left ventricular diastolic filling.  Left Atrium: The left atrium is normal in size.  Right Ventricle: The right ventricle is normal in size. There is normal right ventricular global systolic function.  Right Atrium: The right atrium is normal in size.  Aortic Valve: The aortic valve is trileaflet. There is no aortic valve cusp calcification noted. There is no evidence of aortic valve regurgitation. The peak instantaneous gradient of the aortic valve is 6.6 mmHg.  Mitral Valve: The mitral valve is normal in structure. There is no evidence of mitral valve regurgitation.  Tricuspid Valve: The tricuspid valve is structurally normal. There is trace tricuspid regurgitation. The Doppler estimated RVSP is within normal limits at 16.2 mmHg.  Pulmonic Valve: The pulmonic valve is structurally normal. There is no indication of pulmonic valve regurgitation.  Pericardium: There is no pericardial effusion noted.  Aorta: The aortic root is normal. There is no dilatation of the aortic arch. There is no dilatation of the ascending aorta. There is no dilatation of the aortic  root.  Systemic Veins: The inferior vena cava appears to be of normal size. There is IVC inspiratory collapse greater than 50%.        CONCLUSIONS:   1. Left ventricular ejection fraction is normal, by visual estimate at 55-60%.   2. There is normal right ventricular global systolic function.   3. RVSP within normal limits.     TRICUSPID VALVE/RVSP:                              Normal Ranges:  Peak TR Velocity: 1.82 m/s  RV Syst Pressure: 16.2 mmHg (< 30mmHg)       REVIEW OF SYSTEMS     REVIEW OF SYSTEMS  Review of Systems   Respiratory:  Positive for cough and shortness of breath.    Cardiovascular:  Positive for palpitations.   Musculoskeletal:  Positive for arthralgias.   All other systems reviewed and are negative.        PHYSICAL EXAM     VITAL SIGNS: There were no vitals taken for this visit. There were no vitals taken for this visit. There were no vitals taken for this visit.     CURRENT WEIGHT: [unfilled]  BMI: [unfilled]  PREVIOUS WEIGHTS:  Wt Readings from Last 3 Encounters:   01/02/25 119 kg (262 lb)   11/01/24 112 kg (248 lb)   10/31/24 133 kg (294 lb 3.2 oz)       Physical Exam  Constitutional:       Appearance: Normal appearance.   HENT:      Head: Normocephalic and atraumatic.      Right Ear: External ear normal.      Left Ear: External ear normal.      Nose: Nose normal.      Mouth/Throat:      Mouth: Mucous membranes are moist.      Pharynx: Oropharynx is clear.   Eyes:      Extraocular Movements: Extraocular movements intact.      Conjunctiva/sclera: Conjunctivae normal.      Pupils: Pupils are equal, round, and reactive to light.   Cardiovascular:      Rate and Rhythm: Normal rate and regular rhythm.      Pulses: Normal pulses.      Heart sounds: Normal heart sounds.   Pulmonary:      Effort: Pulmonary effort is normal.      Breath sounds: Normal breath sounds.   Abdominal:      General: Bowel sounds are normal.      Palpations: Abdomen is soft.   Musculoskeletal:         General: Normal range of  motion.      Cervical back: Normal range of motion and neck supple.   Skin:     General: Skin is warm and dry.   Neurological:      General: No focal deficit present.      Mental Status: She is alert and oriented to person, place, and time. Mental status is at baseline.   Psychiatric:         Mood and Affect: Mood normal.         Behavior: Behavior normal.         Thought Content: Thought content normal.         Judgment: Judgment normal.         ASSESSMENT/PLAN     Ms. Gerard is a 64 y.o. female and  has a past medical history of Personal history of malignant neoplasm, unspecified. She was referred to the [facility] for evaluation of asthma/COPD    Problem List and Orders      Assessment and Plan / Recommendations:  Problem List Items Addressed This Visit    None          COPD/asthma  3/15/2024 pulmonary function test  with Normal spirometry, no BD response, air trapping, normal diffusion, FENO 6  albuterol hfa 2 puffs or albuterol nebs every 4-6 hours as needed  - symptoms improving with breztri, cont breztri 2 puffs twice a day, rinse mouth afterwards, since needing albuterol  4 times a day, refilled breztri/albuterol  - will check Ig E, RAST panel and CBC with diff - due to asthma (her father had allergic asthma) - did not obtain     Tobacco abuse - stopped smoking 1 year ago   Given the duration of their cigarettes smoking they meet the eligibility criteria for lung cancer screening with a low-dose CT scan. We discussed the risks and benefits of screening. We discussed the importance of adhering to annual lung cancer screening with this method, the impact of comorbidities, and their ability or willingness to undergo diagnosis and treatment if abnormalities are discovered.  Restarted smoking 1 week ago - 1 ppd    2/27/24 CT chest revealed  10 mm nodular density in the right lung base, located along a bandlike opacity and likely a more focal area of atelectasis or scarring.  Repeat testin 5/28/2024 showing resolution but 4mm nodule --->  repeat CT chest in 12 months- 5/29/2025    ABILIO - past history when patient was at this weight, tapered down to nocturnal O2 with weight loss. Now due to weight gain will repeat in lab sleep study- sleep schedule not completed     Hypoxia: Presents to clinic today with O2 sat _90__% on room air. Oxywalk completed, after 1 minute 87_% ambulating on room air. But HR increased 133, no chest pain pulse ox o 2L 94%  Will order 2 L   - dyspnea -  echo was wnl     Thank you for visiting the Pulmonary clinic today!       Return to clinic after __3 months  or sooner if needed   Sheri Cuevas CNP  My office number is [phone] -     Any test results will be discussed at next visit -- please make sure to make a follow up appt after testing.

## 2025-02-03 ENCOUNTER — APPOINTMENT (OUTPATIENT)
Dept: PRIMARY CARE | Facility: CLINIC | Age: 65
End: 2025-02-03
Payer: MEDICARE

## 2025-02-03 VITALS
HEART RATE: 85 BPM | WEIGHT: 256 LBS | HEIGHT: 66 IN | SYSTOLIC BLOOD PRESSURE: 114 MMHG | TEMPERATURE: 97 F | DIASTOLIC BLOOD PRESSURE: 80 MMHG | BODY MASS INDEX: 41.14 KG/M2

## 2025-02-03 DIAGNOSIS — Z71.3 WEIGHT LOSS COUNSELING, ENCOUNTER FOR: Primary | ICD-10-CM

## 2025-02-03 DIAGNOSIS — E66.01 OBESITY, CLASS III, BMI 40-49.9 (MORBID OBESITY) (MULTI): ICD-10-CM

## 2025-02-03 DIAGNOSIS — E03.9 ACQUIRED HYPOTHYROIDISM: ICD-10-CM

## 2025-02-03 DIAGNOSIS — E78.2 MIXED HYPERLIPIDEMIA: ICD-10-CM

## 2025-02-03 PROCEDURE — G2211 COMPLEX E/M VISIT ADD ON: HCPCS | Performed by: FAMILY MEDICINE

## 2025-02-03 PROCEDURE — 3008F BODY MASS INDEX DOCD: CPT | Performed by: FAMILY MEDICINE

## 2025-02-03 PROCEDURE — 99213 OFFICE O/P EST LOW 20 MIN: CPT | Performed by: FAMILY MEDICINE

## 2025-02-03 PROCEDURE — 1159F MED LIST DOCD IN RCRD: CPT | Performed by: FAMILY MEDICINE

## 2025-02-03 RX ORDER — BROMFENAC 0.76 MG/ML
SOLUTION/ DROPS OPHTHALMIC
COMMUNITY
Start: 2025-01-27

## 2025-02-03 RX ORDER — PHENTERMINE HYDROCHLORIDE 37.5 MG/1
37.5 TABLET ORAL
Qty: 30 TABLET | Refills: 0 | Status: SHIPPED | OUTPATIENT
Start: 2025-02-03 | End: 2025-03-05

## 2025-02-03 NOTE — PROGRESS NOTES
Patient is here 1 month follow-up of medical weight loss she has done excellent.  No issues problems or complaints.    REVIEW OF SYSTEMS: 12 systems negative except for those mentioned in the HPI.    PHYSICAL EXAMINATION:   Constitutional: The patient is alert, in no acute  distress.  Eyes: Extraocular movements are intact.   ENT: external ear canals patent  Neck: no  JVD.  Heart: no JVD  Lungs: Normal respiration without stridor or nasal flaring   Psychiatric: Good judgment and insight. Normal affect and mood.  Skin: no rashes or lesions    Assessment:  per EMR    Plan:  OARRS reviewed appropriate.  Patient is annexes down 6 pounds follow-up in 1 month    This dictation was created using Dragon dictation and may contain errors

## 2025-02-06 ENCOUNTER — CLINICAL SUPPORT (OUTPATIENT)
Dept: SLEEP MEDICINE | Facility: CLINIC | Age: 65
End: 2025-02-06
Payer: MEDICARE

## 2025-02-06 VITALS — BODY MASS INDEX: 41.1 KG/M2 | HEIGHT: 66 IN | WEIGHT: 255.73 LBS

## 2025-02-06 DIAGNOSIS — G47.30 SLEEP APNEA, UNSPECIFIED TYPE: ICD-10-CM

## 2025-02-07 NOTE — PROGRESS NOTES
[facility] TECH NOTE:     Patient: Etta Gerard   MRN//AGE: [id number]  1960  64 y.o.   Technologist: Rafael Schofield   Room: 1   Service Date: 2025        Sleep Testing Location: [facility]    [place]: No data recorded    TECHNOLOGIST SLEEP STUDY PROCEDURE NOTE:   This sleep study is being conducted according to the policies and procedures outlined by the [employer] accreditation standards.  The sleep study procedure and processes involved during this appointment was explained to the patient/patient’s family, questions were answered. The patient/family verbalized understanding.      The patient is a 64 y.o. year old female scheduled for a Diagnostic PSG Split night with montage of:  PSG . she arrived for her appointment.      The study that was ultimately completed was a Diagnostic PSG with montage of:  PSG .    The full study Was completed.  Patient questionnaires completed?: yes     Consents signed? yes    Initial Fall Risk Screening:     Etta has not fallen in the last 6 months. her did not result in injury. Etta does not have a fear of falling. He does not need assistance with sitting, standing, or walking. she does not need assistance walking in her home. she does not need assistance in an unfamiliar setting. The patient is notusing an assistive device.     Brief Study observations: Pt presents as 63 y/o Female here for scheduled PSG/ Split.  Noted no override orders on record.   Study Observations:  Pt arrived on time, was alone, was ambulatory w/o assistance, and appeared alert/ oriented throughout interactions w/ sleep staff.  Throughout intervention window unable to verify high confidence 4% AHI due to < 120 mins of recorded sleep throughout intervention window.  Noted 4% low confidence AHI < 10, accompanied by frequent arousals, frequent awakenings, frequent leg movements, frequent positional changes, periodic WASO, and fragmented sleep architecture.  Noted numerous sleep talking episodes, some  of which appeared to happen during drowsiness and light sleep in addition to deeper sleep examples.  Noted no other abnormal behaviors throughout duration of study.  Study Interventions:  Per Split Night protocol, did not administer CPAP therapy due to < 120mins of recorded sleep observed during intervention window.  Additional Notes:  Epochs for review for Sleep Talking/ Vocalizations: 208, 239, 264, 288, 290, 315, 318, 333, & 651       Deviation to order/protocol and reason: N/A      If PAP, which was preferred mask/pressure/mode: N/A      Other:Additional Notes:  Epochs for review for Sleep Talking/ Vocalizations: 208, 239, 264, 288, 290, 315, 318, 333, & 651    After the procedure, the patient/family was informed to ensure followup with ordering clinician for testing results.      Technologist: Rafael Schofield

## 2025-02-12 ENCOUNTER — APPOINTMENT (OUTPATIENT)
Facility: CLINIC | Age: 65
End: 2025-02-12
Payer: MEDICARE

## 2025-03-05 ENCOUNTER — APPOINTMENT (OUTPATIENT)
Dept: PRIMARY CARE | Facility: CLINIC | Age: 65
End: 2025-03-05
Payer: MEDICARE

## 2025-03-05 VITALS
WEIGHT: 252 LBS | TEMPERATURE: 96 F | HEART RATE: 78 BPM | BODY MASS INDEX: 40.5 KG/M2 | DIASTOLIC BLOOD PRESSURE: 76 MMHG | SYSTOLIC BLOOD PRESSURE: 112 MMHG | HEIGHT: 66 IN

## 2025-03-05 DIAGNOSIS — Z71.3 WEIGHT LOSS COUNSELING, ENCOUNTER FOR: ICD-10-CM

## 2025-03-05 DIAGNOSIS — F31.11 BIPOLAR AFFECTIVE DISORDER, CURRENTLY MANIC, MILD (MULTI): ICD-10-CM

## 2025-03-05 DIAGNOSIS — E66.01 OBESITY, CLASS III, BMI 40-49.9 (MORBID OBESITY) (MULTI): Primary | ICD-10-CM

## 2025-03-05 PROCEDURE — 3008F BODY MASS INDEX DOCD: CPT | Performed by: FAMILY MEDICINE

## 2025-03-05 PROCEDURE — 99213 OFFICE O/P EST LOW 20 MIN: CPT | Performed by: FAMILY MEDICINE

## 2025-03-05 PROCEDURE — 1159F MED LIST DOCD IN RCRD: CPT | Performed by: FAMILY MEDICINE

## 2025-03-05 RX ORDER — PHENTERMINE HYDROCHLORIDE 37.5 MG/1
37.5 TABLET ORAL
Qty: 30 TABLET | Refills: 0 | Status: SHIPPED | OUTPATIENT
Start: 2025-03-05 | End: 2025-04-04

## 2025-03-05 NOTE — PROGRESS NOTES
Patient is here 1 month follow-up medical weight loss.  She is annexes down her 4 pounds.  She is down 10 pounds overall.  She is not been doing any walking.    REVIEW OF SYSTEMS: 12 systems negative except for those mentioned in the HPI.    PHYSICAL EXAMINATION:   Constitutional: The patient is alert, in no acute  distress.  Eyes: Extraocular movements are intact.   ENT: external ear canals patent  Neck: no  JVD.  Heart: no JVD  Lungs: Normal respiration without stridor or nasal flaring   Psychiatric: Good judgment and insight. Normal affect and mood.  Skin: no rashes or lesions    Assessment:  per EMR    Plan:  OARRS reviewed appropriate.  Currently if not exercising at all patient will be losing 4 pounds per calendar month which is what she is doing currently.  If she would go to 4 days walking 20 minutes or 5000 steps she would lose 8 pounds per calendar month    This dictation was created using Dragon dictation and may contain errors

## 2025-03-13 ASSESSMENT — ENCOUNTER SYMPTOMS
ARTHRALGIAS: 1
PALPITATIONS: 1
SHORTNESS OF BREATH: 1
COUGH: 1

## 2025-03-13 NOTE — PROGRESS NOTES
Patient: Etta Gerard    [id number]  : 1960 -- AGE 64 y.o.    Provider: Sheri Cuevas CNP     Location [facility]   Service Date:   3/25/2025            [facility]  New Visit Note      HISTORY OF PRESENT ILLNESS     The patient's referring provider is:Dr Lira, previosly seen by Miko    HISTORY OF PRESENT ILLNESS   Etta Gerard is a 64 y.o. female who presents to a [facility] for an evaluation with concerns of COPD (Sleep Study follow up ). I have independently interviewed and examined the patient in the office and reviewed available records.    3/25/2025    On today's visit, the patient reports no ER vsits or respiratory illnesses. Occ coughing with produictive clear phlegm  C/o wheezing.  C/ o shortness of breath at rest/ Dyspnea with couple min of activity MMRC 3 No chest tightness.   Using rescue inhaler 1-2 times a day, sometimes not at all  She is on breztri.   Wheezing at night         Current History 10/31/2024    On today's visit, the patient reports a little coughing but not much. Occ productive with white phlegm. She wakes up feeling congested and wheezes then then clear her throat  NO wheezing. BUSTILLO continues - she is dyspneic after a couple minutes of walking. Able to complete chores at home.   Denies chest pain.   NO ER visits  Denies heartburn  Denies throat clearing  Denies nighttime symptoms  Restarted smoking 1 ppd X 1 WEEK having cravings           Current History 2024    On today's visit, the patient reports no ER visits. No cough,       Her breathing is getting worse, she is feeling more dyspnenic after a couple minutes. Able to do chores at home.  She becomes very SOB. She is using albuterol a couple times a day.   Denies cough, wheeze, Fevers/chills, BUSTILLO, SOB at rest, chest pain  Allergies - c/o runny nose but denies  post nasal drip   GERD denies   Night time - denies   She wears 3  liters at bedtime not when walking does not have portable unit.         Current History  3/19/2024    On today's visit, the patient reports she started breztri and she could breathe a lot better. She could breathe in better, She clears her throat a lot but does not cough. Her shortness of breath is better, she is not gasping for air. A little wheezing. Denies Fevers/chills chest pain or GERD,    She wears oxygen at night.  She has brought in her oxygen mask to show which is better. She has a dog who bites everyone and she does not have anyone to watch her dog  Allergies denies runny nose or post nasal drip   GERD denies   ED visits denies   Up to date on vaccines - did not get Flu vaccine or COVID booster  Night time - has cough at night  C/o unrested sleep.   She did not use Breztri today        Current History 2/5/2024     On today's visit, the patient reports she wears 3 liters while sleeping. No am headaches. She has restful sleep, no daytime sleepiness. She had CPAP for ABILIO and she lost 100lbs and was able to taper down to nocturnal oxygenation via cannula. Now she has gained 70lbs due to smoking cessation.   At baseline,  has dyspnea on exertion, but  none at rest, but conversational dyspnea. Symptoms started many years ago, but has mostly been stable.  Currently sits for most of the day, works inside the house, but does not carry loads and do strenuous exercise.   Has to stop for breath after walking about 100 meters or a few minutes (mMRC 3).   Relates orthopnea, denies pnd/aidan.  Has gained X 40 pounds in the last X 12 months, since stopped smoking 11 months ago, used patch, nicotine gum.  Improving chronic cough with clear phlegm, C/o  wheezing, and denies, green, blood streaks. No night cough. No hemoptysis. No fever or shivering chills. Has no runny nose, or a tingling sensation in the back of his throat.  Denies chest pain or heartburn.     Previous pulmonary history:  no history of recurrent infections,  or lung disease as a child.  No previous lung hx, never on oxygen or inhaler therapy.    previously was told they have asthma.  currently is on 2-3 liters at night supplemental oxygen. No exacerbations this year    Inhalers/nebulized medications: stiolto - been on 5 years and using albuterol 4 times a day consistently    Hospitalization History: Not been hospitalized over the last year for breathing related problem.    Sleep history:  Complains of snoring, has dreams about apnea, feeling tired during the day, and taking naps during the day.   She was tested and used CPAP. She lost 100 lbs then improved.     ALLERGIES AND MEDICATIONS     ALLERGIES  Allergies   Allergen Reactions    Codeine Unknown    Prednisolone Other    Prednisone Unknown       MEDICATIONS  Current Outpatient Medications   Medication Sig Dispense Refill    albuterol 90 mcg/actuation inhaler INHALE 1 INHALATION BY MOUTH  EVERY 4 HOURS AS NEEDED FOR  COUGH OR WHEEZING 25.5 g 3    ARIPiprazole (Abilify) 15 mg tablet Take 1 tablet (15 mg) by mouth once daily.      atorvastatin (Lipitor) 40 mg tablet Take 1 tablet (40 mg) by mouth once daily. 90 tablet 3    Breztri Aerosphere 160-9-4.8 mcg/actuation HFA aerosol inhaler USE 2 INHALATIONS BY MOUTH TWICE DAILY. RINSE MOUTH WITH WATER  AFTER USE TO REDUCE AFTERTASE  AND INCIDENCE OF CANDIDIASIS. DO NOT SWALLOW 32.1 g 3    bromfenac 0.075 % drops INSTILL 1 DROP INTO RIGHT EYE AS DIRECTED      ergocalciferol (Vitamin D-2) 1.25 MG (76743 UT) capsule Take 1 capsule (1,250 mcg) by mouth 1 (one) time per week.      escitalopram (Lexapro) 20 mg tablet Take 1 tablet (20 mg) by mouth once daily.      fluocinolone (DermOtic) 0.01 % ear drops Administer 5 drops into each ear 2 times a day. 10 mL 11    ipratropium-albuteroL (Duo-Neb) 0.5-2.5 mg/3 mL nebulizer solution Inhale once daily. PRN      ketorolac (Acular) 0.5 % ophthalmic solution INSTILL 1 DROP INTO LEFT EYE 4 TIMES A DAY      levothyroxine (Synthroid,  Levoxyl) 100 mcg tablet Take 1 tablet (100 mcg) by mouth once daily. 90 tablet 1    LORazepam (Ativan) 0.5 mg tablet Take 1 tablet (0.5 mg) by mouth every 6 hours if needed.      moxifloxacin (Vigamox) 0.5 % ophthalmic solution INSTILL 1 DROP IN AFFECTED EYE 4 TIMES A DAY AS DIRECTED      phentermine (Adipex-P) 37.5 mg tablet Take 1 tablet (37.5 mg) by mouth once daily in the morning. Take before meals. 30 tablet 0    prednisoLONE acetate (Pred-Forte) 1 % ophthalmic suspension INSTILL 1 DROP INTO LEFT EYE 4 TIMES A DAY      doxepin (SINEquan) 25 mg capsule Take 1 capsule (25 mg) by mouth.      zolpidem (Ambien) 10 mg tablet Take 1 tablet (10 mg) by mouth once daily as needed.       No current facility-administered medications for this visit.         PAST HISTORY     PAST MEDICAL HISTORY  She  has a past medical history of Personal history of malignant neoplasm, unspecified.hypothyroidism HLD, endometrial cancer    PAST SURGICAL HISTORY  Past Surgical History:   Procedure Laterality Date    CATARACT EXTRACTION Left 07/24/2024    CATARACT EXTRACTION Right 02/19/2025    OTHER SURGICAL HISTORY  05/20/2019    Colon surgery    OTHER SURGICAL HISTORY  05/20/2019    Hysterectomy    OTHER SURGICAL HISTORY  05/20/2019    Foot surgery    OTHER SURGICAL HISTORY  05/21/2019    Tonsillectomy    OTHER SURGICAL HISTORY  05/21/2019    Cholecystectomy    OTHER SURGICAL HISTORY  01/16/2022    Tibia fracture repair       IMMUNIZATION HISTORY  Immunization History   Administered Date(s) Administered    COVID-19, mRNA, LNP-S, PF, 30 mcg/0.3 mL dose 07/22/2021, 08/12/2021    Flu vaccine (IIV4), preservative free *Check age/dose* 09/18/2017, 10/22/2018, 10/08/2019, 11/06/2020, 11/02/2021, 09/20/2022    Influenza, Unspecified 09/20/2022    Influenza, seasonal, injectable 10/10/2018    Pneumococcal polysaccharide vaccine, 23-valent, age 2 years and older (PNEUMOVAX 23) 10/22/2018    Tdap vaccine, age 7 year and older (BOOSTRIX, ADACEL)  09/18/2017    Zoster vaccine, recombinant, adult (SHINGRIX) 02/22/2022, 08/09/2022       SOCIAL HISTORY  She  reports that she has quit smoking. Her smoking use included cigarettes. She started smoking about 25 years ago. She has a 25 pack-year smoking history. She has been exposed to tobacco smoke. She has never used smokeless tobacco. She reports that she does not currently use alcohol. She reports that she does not use drugs. She Patient     OCCUPATIONAL/ENVIRONMENTAL HISTORY  Previously worked as:    DOES/DOES NOT EC: does not have known exposure to asbestos, silica, beryllium or inhaled metals.  DOES/DOES NOT EC: does have exposure to birds or exotic animals. 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  DOES/DOES NOT EC: does have a family history of cancer.  DOES/DOES NOT EC: does not have a family history of autoimmune disorders.    RESULTS/DATA     Pulmonary Function Test Results        Complete Pulmonary Function Test Pre/Post Bronchodialator (Spirometry Pre/Post/DLCO/Lung Volumes) 3/15/2024  Status: Final result     Study Result    Narrative & Impression   Normal spirometry. There is no significant bronchodilator response. Lung volumes are normal. Air trapping is present.The DLCO corrected for hemoglobin is normal.6 minute walk test: patient walked 305 meters, no significant desaturation on room air     Scans on Order [id number]      Pulmonary Function Test - Scan on 3/15/2024  1:59 PM                        Pulmonary Function Test - Scan on 3/19/2024  1:52 AM                          Chest Radiograph         Chest CT  Scan     CT LUNG SCREENING FOLLOW UP CT CHEST WO CONTRAST;  5/28/2024          IMPRESSION:  1. There has been interval resolution of the right lower lobe nodular  focus, most consistent with improving inflammatory/atelectatic  changes.  2. Recommend return to 1 year low-dose chest CT for surveillance.        Echocardiogram     TRANSTHORACIC ECHOCARDIOGRAM REPORT 8/20/2024     Patient Name:      LENO PORRAS       Reading Physician:    [id number] Fahad Denis MD  Study Date:        8/20/2024            Ordering Provider:    [id number] PARESH JUSTIN          PHYSICIAN INTERPRETATION:  Left Ventricle: Left ventricular ejection fraction is normal, by visual estimate at 55-60%. There are no regional wall motion abnormalities. The left ventricular cavity size is normal. Spectral Doppler shows a normal pattern of left ventricular diastolic filling.  Left Atrium: The left atrium is normal in size.  Right Ventricle: The right ventricle is normal in size. There is normal right ventricular global systolic function.  Right Atrium: The right atrium is normal in size.  Aortic Valve: The aortic valve is trileaflet. There is no aortic valve cusp calcification noted. There is no evidence of aortic valve regurgitation. The peak instantaneous gradient of the aortic valve is 6.6 mmHg.  Mitral Valve: The mitral valve is normal in structure. There is no evidence of mitral valve regurgitation.  Tricuspid Valve: The tricuspid valve is structurally normal. There is trace tricuspid regurgitation. The Doppler estimated RVSP is within normal limits at 16.2 mmHg.  Pulmonic Valve: The pulmonic valve is structurally normal. There is no indication of pulmonic valve regurgitation.  Pericardium: There is no pericardial effusion noted.  Aorta: The aortic root is normal. There is no dilatation of the aortic arch. There is no dilatation of the ascending aorta. There is no dilatation of the aortic  "root.  Systemic Veins: The inferior vena cava appears to be of normal size. There is IVC inspiratory collapse greater than 50%.        CONCLUSIONS:   1. Left ventricular ejection fraction is normal, by visual estimate at 55-60%.   2. There is normal right ventricular global systolic function.   3. RVSP within normal limits.     TRICUSPID VALVE/RVSP:                              Normal Ranges:  Peak TR Velocity: 1.82 m/s  RV Syst Pressure: 16.2 mmHg (< 30mmHg)       REVIEW OF SYSTEMS     REVIEW OF SYSTEMS  Review of Systems   Respiratory:  Positive for cough and shortness of breath.    Cardiovascular:  Positive for palpitations.   Musculoskeletal:  Positive for arthralgias.   All other systems reviewed and are negative.        PHYSICAL EXAM     VITAL SIGNS: /78   Pulse 77   Resp 18   Ht 1.676 m (5' 6\")   Wt 115 kg (254 lb)   SpO2 93%   BMI 41.00 kg/m²  /78   Pulse 77   Resp 18   Ht 1.676 m (5' 6\")   Wt 115 kg (254 lb)   SpO2 93%   BMI 41.00 kg/m²  /78   Pulse 77   Resp 18   Ht 1.676 m (5' 6\")   Wt 115 kg (254 lb)   SpO2 93%   BMI 41.00 kg/m²      CURRENT WEIGHT: [unfilled]  BMI: [unfilled]  PREVIOUS WEIGHTS:  Wt Readings from Last 3 Encounters:   03/25/25 115 kg (254 lb)   03/05/25 114 kg (252 lb)   02/06/25 116 kg (255 lb 11.7 oz)       Physical Exam  Constitutional:       Appearance: Normal appearance.   HENT:      Head: Normocephalic and atraumatic.      Right Ear: External ear normal.      Left Ear: External ear normal.      Nose: Nose normal.      Mouth/Throat:      Mouth: Mucous membranes are moist.      Pharynx: Oropharynx is clear.   Eyes:      Extraocular Movements: Extraocular movements intact.      Conjunctiva/sclera: Conjunctivae normal.      Pupils: Pupils are equal, round, and reactive to light.   Cardiovascular:      Rate and Rhythm: Normal rate and regular rhythm.      Pulses: Normal pulses.      Heart sounds: Normal heart sounds.   Pulmonary:      Effort: Pulmonary " effort is normal.      Breath sounds: Normal breath sounds.   Abdominal:      General: Bowel sounds are normal.      Palpations: Abdomen is soft.   Musculoskeletal:         General: Normal range of motion.      Cervical back: Normal range of motion and neck supple.   Skin:     General: Skin is warm and dry.   Neurological:      General: No focal deficit present.      Mental Status: She is alert and oriented to person, place, and time. Mental status is at baseline.   Psychiatric:         Mood and Affect: Mood normal.         Behavior: Behavior normal.         Thought Content: Thought content normal.         Judgment: Judgment normal.       ASSESSMENT/PLAN     Ms. Gerard is a 64 y.o. female and  has a past medical history of Personal history of malignant neoplasm, unspecified. She was referred to the [facility] for evaluation of asthma/COPD    Problem List and Orders      Assessment and Plan / Recommendations:  Problem List Items Addressed This Visit    None          COPD/asthma  3/15/2024 pulmonary function test  with Normal spirometry, no BD response, air trapping, normal diffusion, FENO 6  albuterol hfa 2 puffs or albuterol nebs every 4-6 hours as needed  - symptoms improving with breztri, cont breztri 2 puffs twice a day, rinse mouth afterwards, since needing albuterol  4 times a day, refilled breztri/albuterol  - will check Ig E, RAST panel and CBC with diff - due to asthma (her father had allergic asthma) - did not obtain   - add singulair 10mg at bedtime     Tobacco abuse - stopped smoking 1 year ago   Given the duration of their cigarettes smoking they meet the eligibility criteria for lung cancer screening with a low-dose CT scan. We discussed the risks and benefits of screening. We discussed the importance of adhering to annual lung cancer screening with this method, the impact of comorbidities, and their ability or willingness to undergo diagnosis and treatment if  abnormalities are discovered.  Restarted smoking 1 week ago - 1 ppd  Quit Jan 14 2025 2/27/24 CT chest revealed  10 mm nodular density in the right lung base, located along a bandlike opacity and likely a more focal area of atelectasis or scarring. Repeat testing 5/28/2024 showing resolution but 4mm nodule --->  repeat CT chest in 12 months- 5/29/2025    ABILIO - past history when patient was at this weight, tapered down to nocturnal O2 with weight loss. Now due to weight gain will repeat in lab sleep study- sleep schedule not completed   RDI 3% was 20.3/hr  SpO2 enrique was 87% she was less than or equal to a pulse ox of 88% for 47.3 minutes she meets criteria for the diagnosis of moderate obstructive sleep apnea fitting for CPAP mask initiated during testing continue pressure of 7 cm of H2O    Hypoxia: Presents to clinic today with O2 sat _90__% on room air. Oxywalk completed, after 1 minute 87_% ambulating on room air. But HR increased 133, no chest pain pulse ox o 2L 94%  Will order 2 L - reports machine broke, has not been on O2 for 4 months - will reach out to [facility]   DME -[facility]   - dyspnea -  echo was wnl     Thank you for visiting the Pulmonary clinic today!       Return to clinic after __2months  or sooner if needed   Sheri Cuevas CNP  My office number is [phone] -     Any test results will be discussed at next visit -- please make sure to make a follow up appt after testing.

## 2025-03-15 DIAGNOSIS — J44.9 CHRONIC OBSTRUCTIVE PULMONARY DISEASE, UNSPECIFIED COPD TYPE (MULTI): ICD-10-CM

## 2025-03-17 RX ORDER — ALBUTEROL SULFATE 90 UG/1
INHALANT RESPIRATORY (INHALATION)
Qty: 25.5 G | Refills: 3 | Status: SHIPPED | OUTPATIENT
Start: 2025-03-17

## 2025-03-25 ENCOUNTER — APPOINTMENT (OUTPATIENT)
Facility: CLINIC | Age: 65
End: 2025-03-25
Payer: COMMERCIAL

## 2025-03-25 VITALS
HEIGHT: 66 IN | OXYGEN SATURATION: 93 % | WEIGHT: 254 LBS | BODY MASS INDEX: 40.82 KG/M2 | RESPIRATION RATE: 18 BRPM | DIASTOLIC BLOOD PRESSURE: 78 MMHG | SYSTOLIC BLOOD PRESSURE: 112 MMHG | HEART RATE: 77 BPM

## 2025-03-25 DIAGNOSIS — G47.34 NOCTURNAL HYPOXIA: ICD-10-CM

## 2025-03-25 DIAGNOSIS — J45.20 MILD INTERMITTENT ASTHMA, UNSPECIFIED WHETHER COMPLICATED (HHS-HCC): ICD-10-CM

## 2025-03-25 DIAGNOSIS — J44.9 CHRONIC OBSTRUCTIVE PULMONARY DISEASE, UNSPECIFIED COPD TYPE (MULTI): ICD-10-CM

## 2025-03-25 DIAGNOSIS — G47.30 SLEEP APNEA, UNSPECIFIED TYPE: ICD-10-CM

## 2025-03-25 DIAGNOSIS — Z72.0 TOBACCO ABUSE DISORDER: Primary | ICD-10-CM

## 2025-03-25 PROCEDURE — 3008F BODY MASS INDEX DOCD: CPT | Performed by: NURSE PRACTITIONER

## 2025-03-25 PROCEDURE — 99214 OFFICE O/P EST MOD 30 MIN: CPT | Performed by: NURSE PRACTITIONER

## 2025-03-25 PROCEDURE — 1159F MED LIST DOCD IN RCRD: CPT | Performed by: NURSE PRACTITIONER

## 2025-03-25 PROCEDURE — 1160F RVW MEDS BY RX/DR IN RCRD: CPT | Performed by: NURSE PRACTITIONER

## 2025-03-25 RX ORDER — MONTELUKAST SODIUM 10 MG/1
10 TABLET ORAL NIGHTLY
Qty: 90 EACH | Refills: 1 | Status: SHIPPED | OUTPATIENT
Start: 2025-03-25 | End: 2025-09-21

## 2025-03-25 ASSESSMENT — COPD QUESTIONNAIRES
QUESTION1_COUGHFREQUENCY: 2
QUESTION7_SLEEPQUALITY: 0 - I SLEEP SOUNDLY
QUESTION6_LEAVINGHOUSE: 0 - I AM CONFIDENT LEAVING MY HOME DESPITE MY LUNG CONDITION
QUESTION2_CHESTPHLEGM: 0 - I HAVE NO PHLEGM (MUCUS) IN MY CHEST AT ALL
QUESTION5_HOMEACTIVITIES: 0 - I AM NOT LIMITED DOING ANY ACTIVITIES AT HOME
QUESTION3_CHESTTIGHTNESS: 0 - MY CHEST DOES NOT FEEL TIGHT AT ALL
QUESTION4_WALKINCLINE: 5 - WHEN I WALK UP A HILL OR ONE FLIGHT OF STAIRS I AM VERY BREATHLESS
QUESTION8_ENERGYLEVEL: 4
CAT_TOTALSCORE: 11

## 2025-03-25 ASSESSMENT — COLUMBIA-SUICIDE SEVERITY RATING SCALE - C-SSRS
2. HAVE YOU ACTUALLY HAD ANY THOUGHTS OF KILLING YOURSELF?: NO
6. HAVE YOU EVER DONE ANYTHING, STARTED TO DO ANYTHING, OR PREPARED TO DO ANYTHING TO END YOUR LIFE?: NO
1. IN THE PAST MONTH, HAVE YOU WISHED YOU WERE DEAD OR WISHED YOU COULD GO TO SLEEP AND NOT WAKE UP?: NO

## 2025-03-25 NOTE — PATIENT INSTRUCTIONS
COPD/asthma  3/15/2024 pulmonary function test  with Normal spirometry, no BD response, air trapping, normal diffusion, FENO 6  albuterol hfa 2 puffs or albuterol nebs every 4-6 hours as needed  - symptoms improving with breztri, cont breztri 2 puffs twice a day, rinse mouth afterwards, since needing albuterol  4 times a day, refilled breztri/albuterol  - will check Ig E, RAST panel and CBC with diff - due to asthma (her father had allergic asthma) - did not obtain   - add singulair 10mg at bedtime     Tobacco abuse - stopped smoking 1 year ago   Given the duration of their cigarettes smoking they meet the eligibility criteria for lung cancer screening with a low-dose CT scan. We discussed the risks and benefits of screening. We discussed the importance of adhering to annual lung cancer screening with this method, the impact of comorbidities, and their ability or willingness to undergo diagnosis and treatment if abnormalities are discovered.  Restarted smoking 1 week ago - 1 ppd  Quit Jan 14 2025 2/27/24 CT chest revealed  10 mm nodular density in the right lung base, located along a bandlike opacity and likely a more focal area of atelectasis or scarring. Repeat testing 5/28/2024 showing resolution but 4mm nodule --->  repeat CT chest in 12 months- 5/29/2025    ABILIO - past history when patient was at this weight, tapered down to nocturnal O2 with weight loss. Now due to weight gain will repeat in lab sleep study- sleep schedule not completed   RDI 3% was 20.3/hr  SpO2 enrique was 87% she was less than or equal to a pulse ox of 88% for 47.3 minutes she meets criteria for the diagnosis of moderate obstructive sleep apnea fitting for CPAP mask initiated during testing continue pressure of 7 cm of H2O    Hypoxia: Presents to clinic today with O2 sat _90__% on room air. Oxywalk completed, after 1 minute 87_% ambulating on room air. But HR increased 133, no chest pain pulse ox o 2L 94%  Will order 2 L   - dyspnea -  echo  was wnl     Thank you for visiting the Pulmonary clinic today!       Return to clinic after __3 months  or sooner if needed   Sheri Cuevas CNP  My office number is [phone] -     Any test results will be discussed at next visit -- please make sure to make a follow up appt after testing.

## 2025-04-03 ENCOUNTER — APPOINTMENT (OUTPATIENT)
Dept: PRIMARY CARE | Facility: CLINIC | Age: 65
End: 2025-04-03
Payer: MEDICARE

## 2025-04-03 VITALS
HEIGHT: 66 IN | TEMPERATURE: 96 F | WEIGHT: 249 LBS | DIASTOLIC BLOOD PRESSURE: 78 MMHG | HEART RATE: 86 BPM | SYSTOLIC BLOOD PRESSURE: 110 MMHG | BODY MASS INDEX: 40.02 KG/M2

## 2025-04-03 DIAGNOSIS — Z71.3 WEIGHT LOSS COUNSELING, ENCOUNTER FOR: ICD-10-CM

## 2025-04-03 PROCEDURE — G2211 COMPLEX E/M VISIT ADD ON: HCPCS | Performed by: FAMILY MEDICINE

## 2025-04-03 PROCEDURE — 99213 OFFICE O/P EST LOW 20 MIN: CPT | Performed by: FAMILY MEDICINE

## 2025-04-03 PROCEDURE — 3008F BODY MASS INDEX DOCD: CPT | Performed by: FAMILY MEDICINE

## 2025-04-03 PROCEDURE — 1159F MED LIST DOCD IN RCRD: CPT | Performed by: FAMILY MEDICINE

## 2025-04-03 RX ORDER — PHENTERMINE HYDROCHLORIDE 37.5 MG/1
37.5 TABLET ORAL
Qty: 30 TABLET | Refills: 0 | Status: SHIPPED | OUTPATIENT
Start: 2025-04-03 | End: 2025-05-03

## 2025-04-03 NOTE — PROGRESS NOTES
Patient is here 1 month follow-up of weight loss.  Is done excellent with no issues problems or complaints she is right at the 5% needed to continue.    REVIEW OF SYSTEMS: 12 systems negative except for those mentioned in the HPI.    PHYSICAL EXAMINATION:   Constitutional: The patient is alert, in no acute  distress.  Eyes: Extraocular movements are intact.   ENT: external ear canals patent  Neck: no  JVD.  Heart: no JVD  Lungs: Normal respiration without stridor or nasal flaring   Psychiatric: Good judgment and insight. Normal affect and mood.  Skin: no rashes or lesions    Assessment:  per EMR    Plan:  OARRS reviewed and appropriate.  Patient will follow-up in 1 month needs to be at 2 43-44    This dictation was created using Dragon dictation and may contain errors

## 2025-04-28 ENCOUNTER — TELEPHONE (OUTPATIENT)
Dept: RADIOLOGY | Facility: HOSPITAL | Age: 65
End: 2025-04-28
Payer: COMMERCIAL

## 2025-04-28 NOTE — TELEPHONE ENCOUNTER
Call placed to the patient in an effort to schedule her for her ordered CT of the chest. Voicemail message left with scheduling phone number provided.

## 2025-05-06 ENCOUNTER — TELEPHONE (OUTPATIENT)
Dept: RADIOLOGY | Facility: HOSPITAL | Age: 65
End: 2025-05-06
Payer: COMMERCIAL

## 2025-05-06 NOTE — TELEPHONE ENCOUNTER
Call to the patient in an effort to schedule her for her annual LDCT of the chest. Voicemail message left with office and radiology scheduling phone numbers provided.

## 2025-05-07 ENCOUNTER — APPOINTMENT (OUTPATIENT)
Dept: PRIMARY CARE | Facility: CLINIC | Age: 65
End: 2025-05-07
Payer: COMMERCIAL

## 2025-05-07 VITALS
TEMPERATURE: 97 F | HEART RATE: 80 BPM | WEIGHT: 247 LBS | BODY MASS INDEX: 39.7 KG/M2 | SYSTOLIC BLOOD PRESSURE: 102 MMHG | HEIGHT: 66 IN | DIASTOLIC BLOOD PRESSURE: 78 MMHG

## 2025-05-07 DIAGNOSIS — G47.30 SLEEP APNEA, UNSPECIFIED TYPE: ICD-10-CM

## 2025-05-07 DIAGNOSIS — R73.03 PREDIABETES: ICD-10-CM

## 2025-05-07 DIAGNOSIS — E55.9 VITAMIN D DEFICIENCY: ICD-10-CM

## 2025-05-07 DIAGNOSIS — E66.813 OBESITY, CLASS III, BMI 40-49.9 (MORBID OBESITY): ICD-10-CM

## 2025-05-07 DIAGNOSIS — E03.9 ACQUIRED HYPOTHYROIDISM: ICD-10-CM

## 2025-05-07 DIAGNOSIS — R73.9 HYPERGLYCEMIA: ICD-10-CM

## 2025-05-07 DIAGNOSIS — Z12.11 ENCOUNTER FOR SCREENING FOR MALIGNANT NEOPLASM OF COLON: ICD-10-CM

## 2025-05-07 DIAGNOSIS — Z71.3 WEIGHT LOSS COUNSELING, ENCOUNTER FOR: ICD-10-CM

## 2025-05-07 DIAGNOSIS — C54.1 MALIGNANT NEOPLASM OF ENDOMETRIUM (MULTI): ICD-10-CM

## 2025-05-07 DIAGNOSIS — Z00.00 WELLNESS EXAMINATION: ICD-10-CM

## 2025-05-07 DIAGNOSIS — I73.00 RAYNAUD'S DISEASE WITHOUT GANGRENE: ICD-10-CM

## 2025-05-07 DIAGNOSIS — F17.200 CURRENT SMOKER: Primary | ICD-10-CM

## 2025-05-07 DIAGNOSIS — E78.2 MIXED HYPERLIPIDEMIA: ICD-10-CM

## 2025-05-07 DIAGNOSIS — F31.30 BIPOLAR DISORDER, MOST RECENT EPISODE DEPRESSED (MULTI): ICD-10-CM

## 2025-05-07 DIAGNOSIS — J43.2 CENTRILOBULAR EMPHYSEMA (MULTI): ICD-10-CM

## 2025-05-07 PROCEDURE — 1159F MED LIST DOCD IN RCRD: CPT | Performed by: FAMILY MEDICINE

## 2025-05-07 PROCEDURE — 1123F ACP DISCUSS/DSCN MKR DOCD: CPT | Performed by: FAMILY MEDICINE

## 2025-05-07 PROCEDURE — 99213 OFFICE O/P EST LOW 20 MIN: CPT | Performed by: FAMILY MEDICINE

## 2025-05-07 PROCEDURE — G0439 PPPS, SUBSEQ VISIT: HCPCS | Performed by: FAMILY MEDICINE

## 2025-05-07 PROCEDURE — G0444 DEPRESSION SCREEN ANNUAL: HCPCS | Performed by: FAMILY MEDICINE

## 2025-05-07 PROCEDURE — 99406 BEHAV CHNG SMOKING 3-10 MIN: CPT | Performed by: FAMILY MEDICINE

## 2025-05-07 PROCEDURE — 99497 ADVNCD CARE PLAN 30 MIN: CPT | Performed by: FAMILY MEDICINE

## 2025-05-07 PROCEDURE — 1158F ADVNC CARE PLAN TLK DOCD: CPT | Performed by: FAMILY MEDICINE

## 2025-05-07 PROCEDURE — 3008F BODY MASS INDEX DOCD: CPT | Performed by: FAMILY MEDICINE

## 2025-05-07 RX ORDER — LEVOTHYROXINE SODIUM 100 UG/1
100 TABLET ORAL DAILY
Qty: 90 TABLET | Refills: 1 | Status: SHIPPED | OUTPATIENT
Start: 2025-05-07

## 2025-05-07 NOTE — PROGRESS NOTES
Medicare awv   1 month fuv   Didn't want to go over meds, said none changed   Discuss advance care planning

## 2025-05-07 NOTE — PROGRESS NOTES
Advance Care Planning Note     Discussion Date: 05/07/25   Discussion Participants: patient    The patient wishes to discuss Advance Care Planning today and the following is a brief summary of our discussion.     Patient has capacity to make their own medical decisions: Yes  Health Care Agent/Surrogate Decision Maker documented in chart: Yes    Documents on file and valid:  Advance Directive/Living Will: Yes   Health Care Power of : Yes  Other: brother-     Communication of Medical Status/Prognosis:   tbs     Communication of Treatment Goals/Options:   tbs     Treatment Decisions  Goals of Care: survival is paramount regardless of prognosis, treatment outcome, or burden   tbs  Follow Up Plan  tbs  Team Members  tbs  Time Statement: Total face to face time spent on advance care planning was >15 minutes with 16 minutes spent in counseling, including the explanation..  Cements discussed cardiac and lung screening.  Just had lung screening done today was done calcium score test.  PHQ-9 urine 5 minutes.  Obesity greater than 5 minutes    Patient just came back from visiting her 95-year-old grandmother in [place].  Is not lost the 4 pounds necessary that she is down 2 pounds she be at 243.  Has not had blood work done in a while.  Mood has been stable.    REVIEW OF SYSTEMS: 12 systems negative except for those mentioned in the HPI. Reviewed home risks with patient. Patient feels safe at home.    PHYSICAL EXAMINATION:   Constitutional: The patient is alert and oriented x3, in no acute  distress.  Eyes: Extraocular movements are intact. Pupils are equal and reactive to light  ENT: Bilateral TMs within normal limits. Nasal turbinates are within normal limits. Throat within normal limits.  Neck: Supple without lymphadenopathy or JVD.  Heart: Regular rate and rhythm without murmur, click, gallop, or rub.  Lungs: Clear to auscultation bilaterally. No rales, rhonchi, or  wheezing.  Abdomen: Soft, nontender,  nondistended. Normoactive bowel sounds.   No palpable masses. Normal percussion  Musculoskeletal: 5/5 motor, upper and lower extremities.  Neurologic: Cranial nerves II through XII fully intact. +2/4 DTRs  in ankle and knee.  Psychiatric: Good judgment and insight. Normal affect and mood. No cognitive defects noted.  Lymphatic: Negative as noted above.  Skin: no rashes or lesions    Assessment:  Per EMR    Plan:  Screenings as mentioned above.  OARRS reviewed appropriate.  Patient with a travel did not meet the [place]on will follow-up in July for medical weight loss.  Blood pressure stable needs to stop smoking.  Other medications refill including thyroid.  Also need to recheck A1c.  3 minutes counseling for smoking cessation.  Patient not interested currently still smoking but breathing stable according to her  Discussed with the patient and reviewed annual wellness questionnaire form as well as cognitive deficits. Also discussed with the patient immunizations and risks for colonoscopy and other screening procedures    This dictation was created using Dragon dictation and may contain errors      Nik Lira,   5/7/2025 11:35 AM

## 2025-05-28 ENCOUNTER — APPOINTMENT (OUTPATIENT)
Facility: CLINIC | Age: 65
End: 2025-05-28
Payer: COMMERCIAL

## 2025-05-28 VITALS
WEIGHT: 249.4 LBS | DIASTOLIC BLOOD PRESSURE: 66 MMHG | SYSTOLIC BLOOD PRESSURE: 93 MMHG | HEART RATE: 85 BPM | BODY MASS INDEX: 40.87 KG/M2 | RESPIRATION RATE: 18 BRPM | OXYGEN SATURATION: 86 %

## 2025-05-28 DIAGNOSIS — G47.34 NOCTURNAL HYPOXIA: ICD-10-CM

## 2025-05-28 DIAGNOSIS — Z72.0 TOBACCO ABUSE DISORDER: Primary | ICD-10-CM

## 2025-05-28 DIAGNOSIS — J44.9 CHRONIC OBSTRUCTIVE PULMONARY DISEASE, UNSPECIFIED COPD TYPE (MULTI): ICD-10-CM

## 2025-05-28 DIAGNOSIS — J45.20 MILD INTERMITTENT ASTHMA, UNSPECIFIED WHETHER COMPLICATED (HHS-HCC): ICD-10-CM

## 2025-05-28 DIAGNOSIS — G47.30 SLEEP APNEA, UNSPECIFIED TYPE: ICD-10-CM

## 2025-05-28 DIAGNOSIS — J96.11 CHRONIC RESPIRATORY FAILURE WITH HYPOXIA: ICD-10-CM

## 2025-05-28 DIAGNOSIS — J30.89 ALLERGIC RHINITIS DUE TO OTHER ALLERGIC TRIGGER, UNSPECIFIED SEASONALITY: ICD-10-CM

## 2025-05-28 PROCEDURE — 1160F RVW MEDS BY RX/DR IN RCRD: CPT | Performed by: NURSE PRACTITIONER

## 2025-05-28 PROCEDURE — G8433 SCR FOR DEP NOT CPT DOC RSN: HCPCS | Performed by: NURSE PRACTITIONER

## 2025-05-28 PROCEDURE — 99214 OFFICE O/P EST MOD 30 MIN: CPT | Performed by: NURSE PRACTITIONER

## 2025-05-28 PROCEDURE — 1159F MED LIST DOCD IN RCRD: CPT | Performed by: NURSE PRACTITIONER

## 2025-05-28 RX ORDER — ALBUTEROL SULFATE 0.83 MG/ML
3 SOLUTION RESPIRATORY (INHALATION) ONCE
OUTPATIENT
Start: 2025-05-28 | End: 2025-05-28

## 2025-05-28 RX ORDER — ALBUTEROL SULFATE 90 UG/1
1 INHALANT RESPIRATORY (INHALATION) ONCE
OUTPATIENT
Start: 2025-05-28

## 2025-05-28 ASSESSMENT — COPD QUESTIONNAIRES
QUESTION7_SLEEPQUALITY: 5 - I DON'T SLEEP SOUNDLY BECAUSE OF MY LUNG CONDITION
QUESTION4_WALKINCLINE: 4
QUESTION6_LEAVINGHOUSE: 0 - I AM CONFIDENT LEAVING MY HOME DESPITE MY LUNG CONDITION
QUESTION2_CHESTPHLEGM: 2
QUESTION1_COUGHFREQUENCY: 2
QUESTION3_CHESTTIGHTNESS: 1
QUESTION5_HOMEACTIVITIES: 0 - I AM NOT LIMITED DOING ANY ACTIVITIES AT HOME
CAT_TOTALSCORE: 19
QUESTION8_ENERGYLEVEL: 5 - I HAVE NO ENERGY AT ALL

## 2025-05-28 ASSESSMENT — ASTHMA QUESTIONNAIRES
QUESTION_1 LAST FOUR WEEKS HOW MUCH OF THE TIME DID YOUR ASTHMA KEEP YOU FROM GETTING AS MUCH DONE AT WORK, SCHOOL OR AT HOME: A LITTLE OF THE TIME
ACT_TOTALSCORE: 11
QUESTION_4 LAST FOUR WEEKS HOW OFTEN HAVE YOU USED YOUR RESCUE INHALER OR NEBULIZER MEDICATION (SUCH AS ALBUTEROL): 1 OR TWO TIMES PER DAY
QUESTION_2 LAST FOUR WEEKS HOW OFTEN HAVE YOU HAD SHORTNESS OF BREATH: MORE THAN ONCE A DAY
QUESTION_5 LAST FOUR WEEKS HOW WOULD YOU RATE YOUR ASTHMA CONTROL: SOMEWHAT CONTROLLED

## 2025-05-28 ASSESSMENT — ENCOUNTER SYMPTOMS
LOSS OF SENSATION IN FEET: 0
PALPITATIONS: 0
SHORTNESS OF BREATH: 1
DEPRESSION: 0
COUGH: 1
OCCASIONAL FEELINGS OF UNSTEADINESS: 0
APNEA: 1
ARTHRALGIAS: 1
CHEST TIGHTNESS: 1

## 2025-05-28 NOTE — PROGRESS NOTES
Patient: Etta Gerard    [id number]  : 1960 -- AGE 64 y.o.    Provider: Sheri Cuevas CNP     Location [facility]   Service Date:   2025            [facility]  New Visit Note      HISTORY OF PRESENT ILLNESS     The patient's referring provider is:Dr Lira, previosly seen by Miko    HISTORY OF PRESENT ILLNESS   Etta Gerard is a 64 y.o. female who presents to a [facility] for an evaluation with concerns of Follow-up. I have independently interviewed and examined the patient in the office and reviewed available records.    Last seen in 3/25/2025    2025    On today's visit, the patient presents with hypoxia 86-88% on room air.  She has not used O2 at home - her portable O2 broke for months.  She is 94% on 2L. CPAP ordered in March and she has not scheduled her mask fitting and did not start.   The last several nights she has been awakening gasping for air.     Since last visit no ER visits.  Having productive cough with clear. Wheezing occasionally mostly at night  Wakes up at night with chest tightness   Denies Fevers/chills or shortness of breath at rest  BUSTILLO after a few minutes of walking   Allergies - runny nose and watery eyes   Denies GERD  Using rescue inhaler 3 times a day  Using breztri twice a day and rinsing afterwards.   Occ has trouble clearing secretions,.   Has not used nebulizers all but 4-5 times   CAT 19, ACT 11        3/25/2025  On today's visit, the patient reports no ER vsits or respiratory illnesses. Occ coughing with produictive clear phlegm  C/o wheezing.  C/ o shortness of breath at rest/ Dyspnea with couple min of activity MMRC 3 No chest tightness.   Using rescue inhaler 1-2 times a day, sometimes not at all  She is on breztri.   Wheezing at night         Current History 10/31/2024    On today's visit, the patient reports a little coughing but not much. Occ productive with white  phlegm. She wakes up feeling congested and wheezes then then clear her throat  NO wheezing. BUSTILLO continues - she is dyspneic after a couple minutes of walking. Able to complete chores at home.   Denies chest pain.   NO ER visits  Denies heartburn  Denies throat clearing  Denies nighttime symptoms  Restarted smoking 1 ppd X 1 WEEK having cravings           Current History 6/5/2024    On today's visit, the patient reports no ER visits. No cough,       Her breathing is getting worse, she is feeling more dyspnenic after a couple minutes. Able to do chores at home.  She becomes very SOB. She is using albuterol a couple times a day.   Denies cough, wheeze, Fevers/chills, BUSTILLO, SOB at rest, chest pain  Allergies - c/o runny nose but denies  post nasal drip   GERD denies   Night time - denies   She wears 3 liters at bedtime not when walking does not have portable unit.         Current History  3/19/2024    On today's visit, the patient reports she started breztri and she could breathe a lot better. She could breathe in better, She clears her throat a lot but does not cough. Her shortness of breath is better, she is not gasping for air. A little wheezing. Denies Fevers/chills chest pain or GERD,    She wears oxygen at night.  She has brought in her oxygen mask to show which is better. She has a dog who bites everyone and she does not have anyone to watch her dog  Allergies denies runny nose or post nasal drip   GERD denies   ED visits denies   Up to date on vaccines - did not get Flu vaccine or COVID booster  Night time - has cough at night  C/o unrested sleep.   She did not use Breztri today        Current History 2/5/2024     On today's visit, the patient reports she wears 3 liters while sleeping. No am headaches. She has restful sleep, no daytime sleepiness. She had CPAP for ABILIO and she lost 100lbs and was able to taper down to nocturnal oxygenation via cannula. Now she has gained 70lbs due to smoking cessation.   At  baseline,  has dyspnea on exertion, but  none at rest, but conversational dyspnea. Symptoms started many years ago, but has mostly been stable.  Currently sits for most of the day, works inside the house, but does not carry loads and do strenuous exercise.   Has to stop for breath after walking about 100 meters or a few minutes (mMRC 3).   Relates orthopnea, denies pnd/aidan.  Has gained X 40 pounds in the last X 12 months, since stopped smoking 11 months ago, used patch, nicotine gum.  Improving chronic cough with clear phlegm, C/o  wheezing, and denies, green, blood streaks. No night cough. No hemoptysis. No fever or shivering chills. Has no runny nose, or a tingling sensation in the back of his throat.  Denies chest pain or heartburn.     Previous pulmonary history:  no history of recurrent infections, or lung disease as a child.  No previous lung hx, never on oxygen or inhaler therapy.    previously was told they have asthma.  currently is on 2-3 liters at night supplemental oxygen. No exacerbations this year    Inhalers/nebulized medications: stiolto - been on 5 years and using albuterol 4 times a day consistently    Hospitalization History: Not been hospitalized over the last year for breathing related problem.    Sleep history:  Complains of snoring, has dreams about apnea, feeling tired during the day, and taking naps during the day.   She was tested and used CPAP. She lost 100 lbs then improved.     ALLERGIES AND MEDICATIONS     ALLERGIES  Allergies   Allergen Reactions    Codeine Unknown    Prednisolone Other    Prednisone Unknown       MEDICATIONS  Current Outpatient Medications   Medication Sig Dispense Refill    albuterol 90 mcg/actuation inhaler INHALE 1 INHALATION BY MOUTH  EVERY 4 HOURS AS NEEDED FOR  COUGH OR WHEEZING 25.5 g 3    ARIPiprazole (Abilify) 15 mg tablet Take 1 tablet (15 mg) by mouth once daily.      atorvastatin (Lipitor) 40 mg tablet Take 1 tablet (40 mg) by mouth once daily. 90 tablet 3     Breztri Aerosphere 160-9-4.8 mcg/actuation HFA aerosol inhaler USE 2 INHALATIONS BY MOUTH TWICE DAILY. RINSE MOUTH WITH WATER  AFTER USE TO REDUCE AFTERTASE  AND INCIDENCE OF CANDIDIASIS. DO NOT SWALLOW 32.1 g 3    ergocalciferol (Vitamin D-2) 1.25 MG (71240 UT) capsule Take 1 capsule (1.25 mg) by mouth 1 (one) time per week.      escitalopram (Lexapro) 20 mg tablet Take 1 tablet (20 mg) by mouth once daily.      fluocinolone (DermOtic) 0.01 % ear drops Administer 5 drops into each ear 2 times a day. 10 mL 11    ipratropium-albuteroL (Duo-Neb) 0.5-2.5 mg/3 mL nebulizer solution Inhale once daily. PRN      levothyroxine (Synthroid, Levoxyl) 100 mcg tablet Take 1 tablet (100 mcg) by mouth once daily. 90 tablet 1    LORazepam (Ativan) 0.5 mg tablet Take 1 tablet (0.5 mg) by mouth every 6 hours if needed.      montelukast (Singulair) 10 mg tablet Take 1 tablet (10 mg) by mouth once daily at bedtime. 90 each 1    moxifloxacin (Vigamox) 0.5 % ophthalmic solution INSTILL 1 DROP IN AFFECTED EYE 4 TIMES A DAY AS DIRECTED      doxepin (SINEquan) 25 mg capsule Take 1 capsule (25 mg) by mouth.      phentermine (Adipex-P) 37.5 mg tablet Take 1 tablet (37.5 mg) by mouth once daily in the morning. Take before meals. 30 tablet 0    zolpidem (Ambien) 10 mg tablet Take 1 tablet (10 mg) by mouth once daily as needed.       No current facility-administered medications for this visit.         PAST HISTORY     PAST MEDICAL HISTORY  She  has a past medical history of Anxiety, Asthma, Cancer (Multi), Depression, Disease of thyroid gland, Emphysema of lung (Multi), and Personal history of malignant neoplasm, unspecified.hypothyroidism HLD, endometrial cancer    PAST SURGICAL HISTORY  Past Surgical History:   Procedure Laterality Date    CATARACT EXTRACTION Left 07/24/2024    CATARACT EXTRACTION Right 02/19/2025    CHOLECYSTECTOMY      HYSTERECTOMY      OTHER SURGICAL HISTORY  05/20/2019    Colon surgery    OTHER SURGICAL HISTORY   05/20/2019    Hysterectomy    OTHER SURGICAL HISTORY  05/20/2019    Foot surgery    OTHER SURGICAL HISTORY  05/21/2019    Tonsillectomy    OTHER SURGICAL HISTORY  05/21/2019    Cholecystectomy    OTHER SURGICAL HISTORY  01/16/2022    Tibia fracture repair    TONSILLECTOMY         IMMUNIZATION HISTORY  Immunization History   Administered Date(s) Administered    COVID-19, mRNA, LNP-S, PF, 30 mcg/0.3 mL dose 07/22/2021, 08/12/2021    Flu vaccine (IIV4), preservative free *Check age/dose* 09/18/2017, 10/22/2018, 10/08/2019, 11/06/2020, 11/02/2021, 09/20/2022    Influenza, Unspecified 09/20/2022    Influenza, seasonal, injectable 10/10/2018    Pneumococcal polysaccharide vaccine, 23-valent, age 2 years and older (PNEUMOVAX 23) 10/22/2018    Tdap vaccine, age 7 year and older (BOOSTRIX, ADACEL) 09/18/2017    Zoster vaccine, recombinant, adult (SHINGRIX) 02/22/2022, 08/09/2022       SOCIAL HISTORY  She  reports that she has quit smoking. Her smoking use included cigarettes. She started smoking about 25 years ago. She has a 25 pack-year smoking history. She has been exposed to tobacco smoke. She has quit using smokeless tobacco. She reports that she does not currently use alcohol. She reports that she does not use drugs. She Patient     OCCUPATIONAL/ENVIRONMENTAL HISTORY  Previously worked as:    DOES/DOES NOT EC: does not have known exposure to asbestos, silica, beryllium or inhaled metals.  DOES/DOES NOT EC: does have exposure to birds or exotic animals. 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  DOES/DOES NOT EC: does have a family history of cancer.  DOES/DOES NOT EC: does not have a family history of autoimmune disorders.    RESULTS/DATA     Pulmonary Function Test Results        Complete Pulmonary Function Test Pre/Post Bronchodialator (Spirometry Pre/Post/DLCO/Lung Volumes) 3/15/2024  Status: Final result     Study Result    Narrative & Impression   Normal spirometry. There is no significant bronchodilator response. Lung volumes are normal. Air trapping is present.The DLCO corrected for hemoglobin is normal.6 minute walk test: patient walked 305 meters, no significant desaturation on room air     Scans on Order [id number]      Pulmonary Function Test - Scan on 3/15/2024  1:59 PM                        Pulmonary Function Test - Scan on 3/19/2024  1:52 AM                          Chest Radiograph         Chest CT Scan     CT LUNG SCREENING FOLLOW UP CT CHEST WO CONTRAST;  5/28/2024          IMPRESSION:  1. There has been interval resolution of the right lower lobe nodular  focus, most consistent with improving inflammatory/atelectatic  changes.  2. Recommend return to 1 year low-dose chest CT for surveillance.        Echocardiogram     TRANSTHORACIC ECHOCARDIOGRAM REPORT 8/20/2024     Patient Name:      LENO BANDAVERN       Reading Physician:    [id number] Fahad Denis MD  Study Date:        8/20/2024            Ordering Provider:    [id number] JUSTIN          PHYSICIAN INTERPRETATION:  Left Ventricle: Left ventricular ejection fraction is normal, by visual estimate at 55-60%. There are no regional wall motion abnormalities. The left ventricular cavity size is normal. Spectral Doppler shows a normal pattern of left ventricular diastolic filling.  Left Atrium: The left atrium is normal in size.  Right Ventricle: The right ventricle is normal in size. There is normal right ventricular global systolic function.  Right Atrium: The right  atrium is normal in size.  Aortic Valve: The aortic valve is trileaflet. There is no aortic valve cusp calcification noted. There is no evidence of aortic valve regurgitation. The peak instantaneous gradient of the aortic valve is 6.6 mmHg.  Mitral Valve: The mitral valve is normal in structure. There is no evidence of mitral valve regurgitation.  Tricuspid Valve: The tricuspid valve is structurally normal. There is trace tricuspid regurgitation. The Doppler estimated RVSP is within normal limits at 16.2 mmHg.  Pulmonic Valve: The pulmonic valve is structurally normal. There is no indication of pulmonic valve regurgitation.  Pericardium: There is no pericardial effusion noted.  Aorta: The aortic root is normal. There is no dilatation of the aortic arch. There is no dilatation of the ascending aorta. There is no dilatation of the aortic root.  Systemic Veins: The inferior vena cava appears to be of normal size. There is IVC inspiratory collapse greater than 50%.        CONCLUSIONS:   1. Left ventricular ejection fraction is normal, by visual estimate at 55-60%.   2. There is normal right ventricular global systolic function.   3. RVSP within normal limits.     TRICUSPID VALVE/RVSP:                              Normal Ranges:  Peak TR Velocity: 1.82 m/s  RV Syst Pressure: 16.2 mmHg (< 30mmHg)       REVIEW OF SYSTEMS     REVIEW OF SYSTEMS  Review of Systems   Respiratory:  Positive for apnea, cough, chest tightness and shortness of breath.    Cardiovascular:  Negative for palpitations.   Musculoskeletal:  Positive for arthralgias.   All other systems reviewed and are negative.        PHYSICAL EXAM     VITAL SIGNS: BP 93/66   Pulse 85   Resp 18   Wt 113 kg (249 lb 6.4 oz)   SpO2 (!) 86%   BMI 40.87 kg/m²  BP 93/66   Pulse 85   Resp 18   Wt 113 kg (249 lb 6.4 oz)   SpO2 (!) 86%   BMI 40.87 kg/m²  BP 93/66   Pulse 85   Resp 18   Wt 113 kg (249 lb 6.4 oz)   SpO2 (!) 86%   BMI 40.87 kg/m²      CURRENT WEIGHT:  [unfilled]  BMI: [unfilled]  PREVIOUS WEIGHTS:  Wt Readings from Last 3 Encounters:   05/28/25 113 kg (249 lb 6.4 oz)   05/07/25 112 kg (247 lb)   04/03/25 113 kg (249 lb)       Physical Exam  Constitutional:       Appearance: Normal appearance.      Comments: patient smelled of cigarette smoke   HENT:      Head: Normocephalic and atraumatic.      Right Ear: External ear normal.      Left Ear: External ear normal.      Nose: Nose normal.      Mouth/Throat:      Mouth: Mucous membranes are moist.      Pharynx: Oropharynx is clear.   Eyes:      Extraocular Movements: Extraocular movements intact.      Conjunctiva/sclera: Conjunctivae normal.      Pupils: Pupils are equal, round, and reactive to light.   Cardiovascular:      Rate and Rhythm: Normal rate and regular rhythm.      Pulses: Normal pulses.      Heart sounds: Normal heart sounds.   Pulmonary:      Effort: Pulmonary effort is normal.      Breath sounds: Normal breath sounds.   Abdominal:      General: Bowel sounds are normal.      Palpations: Abdomen is soft.   Musculoskeletal:         General: Normal range of motion.      Cervical back: Normal range of motion and neck supple.   Skin:     General: Skin is warm and dry.   Neurological:      General: No focal deficit present.      Mental Status: She is alert and oriented to person, place, and time. Mental status is at baseline.   Psychiatric:         Mood and Affect: Mood normal.         Behavior: Behavior normal.         Thought Content: Thought content normal.         Judgment: Judgment normal.         ASSESSMENT/PLAN     Ms. Gerard is a 64 y.o. female and  has a past medical history of Anxiety, Asthma, Cancer (Multi), Depression, Disease of thyroid gland, Emphysema of lung (Multi), and Personal history of malignant neoplasm, unspecified. She was referred to the [facility] for evaluation of asthma/COPD    Problem List and Orders      Assessment and Plan /  Recommendations:  Problem List Items Addressed This Visit    None          COPD/asthma  3/15/2024 pulmonary function test  with Normal spirometry, no BD response, air trapping, normal diffusion, FENO 6  albuterol hfa 2 puffs or albuterol nebs every 4-6 hours as needed  - due for repeat PFT testing, will obtain ABGs on 2 L   - symptoms improving with breztri, cont breztri 2 puffs twice a day, rinse mouth afterwards, since needing albuterol  4 times a day, refilled breztri/albuterol  - will check Ig E, RAST panel and CBC with diff - due to asthma (her father had allergic asthma) - did not obtain   -  singulair 10mg at bedtime     Tobacco abuse - stopped smoking 1 year ago   Given the duration of their cigarettes smoking they meet the eligibility criteria for lung cancer screening with a low-dose CT scan. We discussed the risks and benefits of screening. We discussed the importance of adhering to annual lung cancer screening with this method, the impact of comorbidities, and their ability or willingness to undergo diagnosis and treatment if abnormalities are discovered.  Restarted smoking 1 week ago - 1 ppd  Quit Jan 14 2025  - patient smelled of cigarette smoke but denies smoking    2/27/24 CT chest revealed  10 mm nodular density in the right lung base, located along a bandlike opacity and likely a more focal area of atelectasis or scarring. Repeat testing 5/28/2024 showing resolution but 4mm nodule --->  repeat CT chest in 12 months- 5/29/2025    ABILIO - past history when patient was at this weight, tapered down to nocturnal O2 with weight loss. Now due to weight gain will repeat in lab sleep study- sleep schedule not completed   RDI 3% was 20.3/hr  SpO2 enrique was 87% she was less than or equal to a pulse ox of 88% for 47.3 minutes she meets criteria for the diagnosis of moderate obstructive sleep apnea fitting for CPAP mask initiated during testing continue pressure of 7 cm of H2O  - has not set up mask fitting      Hypoxia: previous visit  with O2 sat _90__% on room air. Oxywalk completed, after 1 minute 87_% ambulating on room air. But HR increased 133, no chest pain pulse ox o 2L 94%  Will order 2 L - reports machine broke, has not been on O2 for 4 months - will reach out to [facility]   - will reach out to [facility] - today pulse ox on arrival 86% on 2 L 91-94%   DME -[facility]   - dyspnea -  echo was wnl   Patient declining to take O2 tank with her    Thank you for visiting the Pulmonary clinic today!       Return to clinic after __2-3 months  or sooner if needed   Sheri Cuevas CNP  My office number is [phone] -     Any test results will be discussed at next visit -- please make sure to make a follow up appt after testing.

## 2025-05-28 NOTE — PATIENT INSTRUCTIONS
Thank you for visiting the Pulmonary clinic today!     Your breathing medications: breztri and albuterol   Tests CT chest and pulmonary function test and blood work      Education in the clinic call [facility] to obtain portable O2 and CPAP mask fitting class Phone: [phone]     Return in 2-3 months      Sheri Cuevas CNP  My office number is [phone] -     Call to schedule  for radiology - CT scans/PFTs etc at  [phone]  General scheduling  [phone]     Any test results will be discussed at next visit -- please make sure to make a follow up appt after testing.

## 2025-05-29 LAB
25(OH)D3+25(OH)D2 SERPL-MCNC: 44 NG/ML (ref 30–100)
ALBUMIN SERPL-MCNC: 4.2 G/DL (ref 3.6–5.1)
ALBUMIN/CREAT UR: 7 MG/G CREAT
ALP SERPL-CCNC: 200 U/L (ref 37–153)
ALT SERPL-CCNC: 12 U/L (ref 6–29)
ANION GAP SERPL CALCULATED.4IONS-SCNC: 6 MMOL/L (CALC) (ref 7–17)
AST SERPL-CCNC: 17 U/L (ref 10–35)
BASOPHILS # BLD AUTO: 31 CELLS/UL (ref 0–200)
BASOPHILS NFR BLD AUTO: 0.4 %
BILIRUB SERPL-MCNC: 0.4 MG/DL (ref 0.2–1.2)
BUN SERPL-MCNC: 12 MG/DL (ref 7–25)
CALCIUM SERPL-MCNC: 9.4 MG/DL (ref 8.6–10.4)
CHLORIDE SERPL-SCNC: 102 MMOL/L (ref 98–110)
CHOLEST SERPL-MCNC: 158 MG/DL
CHOLEST/HDLC SERPL: 2.8 (CALC)
CO2 SERPL-SCNC: 30 MMOL/L (ref 20–32)
CREAT SERPL-MCNC: 0.68 MG/DL (ref 0.5–1.05)
CREAT UR-MCNC: 367 MG/DL (ref 20–275)
EGFRCR SERPLBLD CKD-EPI 2021: 96 ML/MIN/1.73M2
EOSINOPHIL # BLD AUTO: 101 CELLS/UL (ref 15–500)
EOSINOPHIL NFR BLD AUTO: 1.3 %
ERYTHROCYTE [DISTWIDTH] IN BLOOD BY AUTOMATED COUNT: 12.6 % (ref 11–15)
EST. AVERAGE GLUCOSE BLD GHB EST-MCNC: 134 MG/DL
EST. AVERAGE GLUCOSE BLD GHB EST-SCNC: 7.4 MMOL/L
GLUCOSE SERPL-MCNC: 120 MG/DL (ref 65–99)
HBA1C MFR BLD: 6.3 %
HCT VFR BLD AUTO: 45.6 % (ref 35–45)
HDLC SERPL-MCNC: 57 MG/DL
HGB BLD-MCNC: 15.4 G/DL (ref 11.7–15.5)
LDLC SERPL CALC-MCNC: 81 MG/DL (CALC)
LYMPHOCYTES # BLD AUTO: 1388 CELLS/UL (ref 850–3900)
LYMPHOCYTES NFR BLD AUTO: 17.8 %
MCH RBC QN AUTO: 31 PG (ref 27–33)
MCHC RBC AUTO-ENTMCNC: 33.8 G/DL (ref 32–36)
MCV RBC AUTO: 91.8 FL (ref 80–100)
MICROALBUMIN UR-MCNC: 2.5 MG/DL
MONOCYTES # BLD AUTO: 554 CELLS/UL (ref 200–950)
MONOCYTES NFR BLD AUTO: 7.1 %
NEUTROPHILS # BLD AUTO: 5725 CELLS/UL (ref 1500–7800)
NEUTROPHILS NFR BLD AUTO: 73.4 %
NONHDLC SERPL-MCNC: 101 MG/DL (CALC)
PLATELET # BLD AUTO: 287 THOUSAND/UL (ref 140–400)
PMV BLD REES-ECKER: 9.7 FL (ref 7.5–12.5)
POTASSIUM SERPL-SCNC: 4.4 MMOL/L (ref 3.5–5.3)
PROT SERPL-MCNC: 7 G/DL (ref 6.1–8.1)
RBC # BLD AUTO: 4.97 MILLION/UL (ref 3.8–5.1)
SODIUM SERPL-SCNC: 138 MMOL/L (ref 135–146)
TRIGL SERPL-MCNC: 105 MG/DL
TSH SERPL-ACNC: 4.67 MIU/L (ref 0.4–4.5)
WBC # BLD AUTO: 7.8 THOUSAND/UL (ref 3.8–10.8)

## 2025-05-30 DIAGNOSIS — E55.9 VITAMIN D DEFICIENCY: ICD-10-CM

## 2025-05-30 DIAGNOSIS — E03.9 ACQUIRED HYPOTHYROIDISM: ICD-10-CM

## 2025-05-30 RX ORDER — LEVOTHYROXINE SODIUM 125 UG/1
125 TABLET ORAL DAILY
Qty: 90 TABLET | Refills: 0 | Status: SHIPPED | OUTPATIENT
Start: 2025-05-30

## 2025-07-07 RX ORDER — LEVOTHYROXINE SODIUM 100 UG/1
TABLET ORAL
COMMUNITY
Start: 2025-06-06

## 2025-07-08 ENCOUNTER — APPOINTMENT (OUTPATIENT)
Dept: PRIMARY CARE | Facility: CLINIC | Age: 65
End: 2025-07-08
Payer: COMMERCIAL

## 2025-07-08 VITALS
DIASTOLIC BLOOD PRESSURE: 80 MMHG | WEIGHT: 241 LBS | HEIGHT: 66 IN | HEART RATE: 93 BPM | BODY MASS INDEX: 38.73 KG/M2 | SYSTOLIC BLOOD PRESSURE: 118 MMHG | TEMPERATURE: 96.6 F

## 2025-07-08 DIAGNOSIS — E55.9 VITAMIN D DEFICIENCY: ICD-10-CM

## 2025-07-08 DIAGNOSIS — Z71.3 WEIGHT LOSS COUNSELING, ENCOUNTER FOR: Primary | ICD-10-CM

## 2025-07-08 DIAGNOSIS — E66.9 OBESITY WITH BODY MASS INDEX 30 OR GREATER: ICD-10-CM

## 2025-07-08 DIAGNOSIS — F17.200 CURRENT SMOKER: ICD-10-CM

## 2025-07-08 PROCEDURE — 1159F MED LIST DOCD IN RCRD: CPT | Performed by: FAMILY MEDICINE

## 2025-07-08 PROCEDURE — G2211 COMPLEX E/M VISIT ADD ON: HCPCS | Performed by: FAMILY MEDICINE

## 2025-07-08 PROCEDURE — 3008F BODY MASS INDEX DOCD: CPT | Performed by: FAMILY MEDICINE

## 2025-07-08 PROCEDURE — 99213 OFFICE O/P EST LOW 20 MIN: CPT | Performed by: FAMILY MEDICINE

## 2025-07-08 RX ORDER — PHENTERMINE HYDROCHLORIDE 37.5 MG/1
37.5 TABLET ORAL
Qty: 30 TABLET | Refills: 0 | Status: SHIPPED | OUTPATIENT
Start: 2025-07-08 | End: 2025-08-07

## 2025-07-08 RX ORDER — ZOLPIDEM TARTRATE 5 MG/1
TABLET ORAL NIGHTLY PRN
COMMUNITY
Start: 2025-07-02

## 2025-07-08 ASSESSMENT — PATIENT HEALTH QUESTIONNAIRE - PHQ9
SUM OF ALL RESPONSES TO PHQ9 QUESTIONS 1 AND 2: 1
1. LITTLE INTEREST OR PLEASURE IN DOING THINGS: NOT AT ALL
2. FEELING DOWN, DEPRESSED OR HOPELESS: SEVERAL DAYS

## 2025-07-08 NOTE — PROGRESS NOTES
Patient is here 2-month follow-up with medical weight loss is actually done well and is down 8 pounds.  No current issues problems or complaints.    REVIEW OF SYSTEMS: 12 systems negative except for those mentioned in the HPI.    PHYSICAL EXAMINATION:   Constitutional: The patient is alert, in no acute  distress.  Eyes: Extraocular movements are intact.   ENT: external ear canals patent  Neck: no  JVD.  Heart: no JVD  Lungs: Normal respiration without stridor or nasal flaring   Psychiatric: Good judgment and insight. Normal affect and mood.  Skin: no rashes or lesions    Assessment:  per EMR    Plan:  Patient is actually been doing excellent tracking her calories we will follow-up in 1 months go back on the Adipex    This dictation was created using Dragon dictation and may contain errors

## 2025-08-07 RX ORDER — NEOMYCIN SULFATE, POLYMYXIN B SULFATE AND DEXAMETHASONE 3.5; 10000; 1 MG/ML; [USP'U]/ML; MG/ML
SUSPENSION/ DROPS OPHTHALMIC
COMMUNITY
Start: 2025-07-08

## 2025-08-07 RX ORDER — PREDNISOLONE ACETATE 10 MG/ML
SUSPENSION/ DROPS OPHTHALMIC
COMMUNITY
Start: 2025-07-23

## 2025-08-08 ENCOUNTER — APPOINTMENT (OUTPATIENT)
Dept: PRIMARY CARE | Facility: CLINIC | Age: 65
End: 2025-08-08
Payer: MEDICARE

## 2025-08-08 VITALS
HEART RATE: 85 BPM | BODY MASS INDEX: 38.09 KG/M2 | SYSTOLIC BLOOD PRESSURE: 108 MMHG | HEIGHT: 66 IN | WEIGHT: 237 LBS | TEMPERATURE: 97 F | DIASTOLIC BLOOD PRESSURE: 70 MMHG

## 2025-08-08 DIAGNOSIS — Z71.3 WEIGHT LOSS COUNSELING, ENCOUNTER FOR: ICD-10-CM

## 2025-08-08 PROCEDURE — 99213 OFFICE O/P EST LOW 20 MIN: CPT | Performed by: FAMILY MEDICINE

## 2025-08-08 PROCEDURE — 3008F BODY MASS INDEX DOCD: CPT | Performed by: FAMILY MEDICINE

## 2025-08-08 PROCEDURE — 1159F MED LIST DOCD IN RCRD: CPT | Performed by: FAMILY MEDICINE

## 2025-08-08 PROCEDURE — G2211 COMPLEX E/M VISIT ADD ON: HCPCS | Performed by: FAMILY MEDICINE

## 2025-08-08 RX ORDER — PHENTERMINE HYDROCHLORIDE 37.5 MG/1
37.5 TABLET ORAL
Qty: 30 TABLET | Refills: 0 | Status: SHIPPED | OUTPATIENT
Start: 2025-08-08 | End: 2025-09-07

## 2025-08-08 NOTE — PROGRESS NOTES
Patient is here for follow-up of medical weight loss and blood pressure.  Is been doing excellent.  He is actually lost a good amount of weight this last month.    REVIEW OF SYSTEMS: 12 systems negative except for those mentioned in the HPI.    PHYSICAL EXAMINATION:   Constitutional: The patient is alert, in no acute  distress.  Eyes: Extraocular movements are intact.   ENT: external ear canals patent  Neck: no  JVD.  Heart: no JVD  Lungs: Normal respiration without stridor or nasal flaring   Psychiatric: Good judgment and insight. Normal affect and mood.  Skin: no rashes or lesions    Assessment:  per EMR    Plan:  OARRS reviewed appropriate.  She is within the normal variance.  Needs to be down below 233 next visit because she is slightly off cycle.  Blood pressure stable breathing stable    This dictation was created using Dragon dictation and may contain errors

## 2025-08-25 ASSESSMENT — ENCOUNTER SYMPTOMS
ARTHRALGIAS: 1
COUGH: 1
APNEA: 1
SHORTNESS OF BREATH: 1
CHEST TIGHTNESS: 1
PALPITATIONS: 0

## 2025-09-02 ENCOUNTER — APPOINTMENT (OUTPATIENT)
Facility: CLINIC | Age: 65
End: 2025-09-02
Payer: COMMERCIAL

## 2025-09-02 ASSESSMENT — COLUMBIA-SUICIDE SEVERITY RATING SCALE - C-SSRS
6. HAVE YOU EVER DONE ANYTHING, STARTED TO DO ANYTHING, OR PREPARED TO DO ANYTHING TO END YOUR LIFE?: NO
1. IN THE PAST MONTH, HAVE YOU WISHED YOU WERE DEAD OR WISHED YOU COULD GO TO SLEEP AND NOT WAKE UP?: NO
2. HAVE YOU ACTUALLY HAD ANY THOUGHTS OF KILLING YOURSELF?: NO

## 2025-09-02 ASSESSMENT — COPD QUESTIONNAIRES
QUESTION5_HOMEACTIVITIES: 4
QUESTION1_COUGHFREQUENCY: 3
QUESTION4_WALKINCLINE: 4
QUESTION7_SLEEPQUALITY: 1
CAT_TOTALSCORE: 22
QUESTION2_CHESTPHLEGM: 3
QUESTION8_ENERGYLEVEL: 3
QUESTION6_LEAVINGHOUSE: 2
QUESTION3_CHESTTIGHTNESS: 2

## 2025-09-02 ASSESSMENT — ENCOUNTER SYMPTOMS
LOSS OF SENSATION IN FEET: 0
OCCASIONAL FEELINGS OF UNSTEADINESS: 0
DEPRESSION: 0

## 2025-09-08 ENCOUNTER — APPOINTMENT (OUTPATIENT)
Dept: PRIMARY CARE | Facility: CLINIC | Age: 65
End: 2025-09-08
Payer: COMMERCIAL

## 2025-10-22 ENCOUNTER — APPOINTMENT (OUTPATIENT)
Facility: CLINIC | Age: 65
End: 2025-10-22
Payer: COMMERCIAL